# Patient Record
Sex: MALE | Race: WHITE | NOT HISPANIC OR LATINO
[De-identification: names, ages, dates, MRNs, and addresses within clinical notes are randomized per-mention and may not be internally consistent; named-entity substitution may affect disease eponyms.]

---

## 2017-12-12 ENCOUNTER — APPOINTMENT (OUTPATIENT)
Dept: UROLOGY | Facility: CLINIC | Age: 57
End: 2017-12-12
Payer: COMMERCIAL

## 2017-12-12 VITALS
SYSTOLIC BLOOD PRESSURE: 180 MMHG | BODY MASS INDEX: 28.88 KG/M2 | HEIGHT: 67 IN | HEART RATE: 95 BPM | WEIGHT: 184 LBS | DIASTOLIC BLOOD PRESSURE: 101 MMHG

## 2017-12-12 DIAGNOSIS — Z78.9 OTHER SPECIFIED HEALTH STATUS: ICD-10-CM

## 2017-12-12 DIAGNOSIS — I10 ESSENTIAL (PRIMARY) HYPERTENSION: ICD-10-CM

## 2017-12-12 PROCEDURE — 99203 OFFICE O/P NEW LOW 30 MIN: CPT

## 2017-12-12 RX ORDER — ENALAPRIL MALEATE 10 MG/1
10 TABLET ORAL
Refills: 0 | Status: ACTIVE | COMMUNITY

## 2018-01-24 ENCOUNTER — APPOINTMENT (OUTPATIENT)
Dept: UROLOGY | Facility: CLINIC | Age: 58
End: 2018-01-24
Payer: COMMERCIAL

## 2018-01-24 VITALS
DIASTOLIC BLOOD PRESSURE: 88 MMHG | BODY MASS INDEX: 28.88 KG/M2 | WEIGHT: 184 LBS | SYSTOLIC BLOOD PRESSURE: 152 MMHG | HEART RATE: 112 BPM | HEIGHT: 67 IN

## 2018-01-24 PROCEDURE — 99213 OFFICE O/P EST LOW 20 MIN: CPT

## 2018-01-24 PROCEDURE — 76872 US TRANSRECTAL: CPT

## 2018-03-28 ENCOUNTER — APPOINTMENT (OUTPATIENT)
Dept: UROLOGY | Facility: CLINIC | Age: 58
End: 2018-03-28
Payer: COMMERCIAL

## 2018-03-28 VITALS
HEART RATE: 98 BPM | WEIGHT: 184 LBS | SYSTOLIC BLOOD PRESSURE: 169 MMHG | DIASTOLIC BLOOD PRESSURE: 94 MMHG | BODY MASS INDEX: 28.88 KG/M2 | HEIGHT: 67 IN

## 2018-03-28 DIAGNOSIS — R33.9 RETENTION OF URINE, UNSPECIFIED: ICD-10-CM

## 2018-03-28 PROCEDURE — 51741 ELECTRO-UROFLOWMETRY FIRST: CPT

## 2018-03-28 PROCEDURE — 99213 OFFICE O/P EST LOW 20 MIN: CPT

## 2018-03-28 PROCEDURE — 51798 US URINE CAPACITY MEASURE: CPT

## 2018-09-24 ENCOUNTER — APPOINTMENT (OUTPATIENT)
Dept: UROLOGY | Facility: CLINIC | Age: 58
End: 2018-09-24
Payer: COMMERCIAL

## 2018-09-24 VITALS
WEIGHT: 184 LBS | SYSTOLIC BLOOD PRESSURE: 151 MMHG | HEART RATE: 90 BPM | DIASTOLIC BLOOD PRESSURE: 87 MMHG | HEIGHT: 67 IN | BODY MASS INDEX: 28.88 KG/M2

## 2018-09-24 PROCEDURE — 99213 OFFICE O/P EST LOW 20 MIN: CPT

## 2018-10-26 ENCOUNTER — OUTPATIENT (OUTPATIENT)
Dept: OUTPATIENT SERVICES | Facility: HOSPITAL | Age: 58
LOS: 1 days | Discharge: HOME | End: 2018-10-26

## 2018-10-26 ENCOUNTER — APPOINTMENT (OUTPATIENT)
Dept: UROLOGY | Facility: CLINIC | Age: 58
End: 2018-10-26
Payer: COMMERCIAL

## 2018-10-26 ENCOUNTER — LABORATORY RESULT (OUTPATIENT)
Age: 58
End: 2018-10-26

## 2018-10-26 VITALS
HEIGHT: 67 IN | WEIGHT: 192 LBS | BODY MASS INDEX: 30.13 KG/M2 | HEART RATE: 100 BPM | DIASTOLIC BLOOD PRESSURE: 102 MMHG | SYSTOLIC BLOOD PRESSURE: 209 MMHG

## 2018-10-26 PROCEDURE — 99213 OFFICE O/P EST LOW 20 MIN: CPT | Mod: 25

## 2018-10-26 PROCEDURE — 76872 US TRANSRECTAL: CPT

## 2018-10-26 PROCEDURE — 55700: CPT

## 2018-10-29 DIAGNOSIS — R89.7 ABNORMAL HISTOLOGICAL FINDINGS IN SPECIMENS FROM OTHER ORGANS, SYSTEMS AND TISSUES: ICD-10-CM

## 2018-11-12 ENCOUNTER — APPOINTMENT (OUTPATIENT)
Dept: UROLOGY | Facility: CLINIC | Age: 58
End: 2018-11-12
Payer: COMMERCIAL

## 2018-11-12 VITALS
WEIGHT: 192 LBS | DIASTOLIC BLOOD PRESSURE: 101 MMHG | SYSTOLIC BLOOD PRESSURE: 180 MMHG | HEART RATE: 109 BPM | BODY MASS INDEX: 30.13 KG/M2 | HEIGHT: 67 IN

## 2018-11-12 PROCEDURE — 99213 OFFICE O/P EST LOW 20 MIN: CPT

## 2019-02-11 ENCOUNTER — RX RENEWAL (OUTPATIENT)
Age: 59
End: 2019-02-11

## 2019-04-13 ENCOUNTER — OUTPATIENT (OUTPATIENT)
Dept: OUTPATIENT SERVICES | Facility: HOSPITAL | Age: 59
LOS: 1 days | Discharge: HOME | End: 2019-04-13

## 2019-04-13 DIAGNOSIS — R97.20 ELEVATED PROSTATE SPECIFIC ANTIGEN [PSA]: ICD-10-CM

## 2019-05-11 LAB
4K SCORE CALCULATION: 2 %
FREE PSA: 0.55 NG/ML
PERCENT FREE PSA: 17 %
TOTAL PSA: 3.31 NG/ML

## 2019-05-20 ENCOUNTER — APPOINTMENT (OUTPATIENT)
Dept: UROLOGY | Facility: CLINIC | Age: 59
End: 2019-05-20
Payer: COMMERCIAL

## 2019-05-20 PROCEDURE — 99213 OFFICE O/P EST LOW 20 MIN: CPT

## 2019-05-20 NOTE — HISTORY OF PRESENT ILLNESS
[FreeTextEntry1] : This is a 59 year male who presents for follow up of nocturia 3x. \par \par Drinks a lot of water -- 1.5L during dinner\par states that drinks water when wakes to urinate\par \par During the day frequency is present but not as much of a problem.\par no hematuria, no dysuria. \par \par started on flomax for enlarged prostate with symptoms- states that nocturia improved to 1x per night and urinating less during the day-- patient is happy with the results\par quit smoking 20 years ago\par no prostate cancer family history \par \par uroflow/pvr on previous visit: Qmax 18.8 wth PVr of 381 (on my repeat was only 96). Very erratic curve\par Will make an effort to decrease fluid consumption, especially before bedtime\par prostate size = 41g\par \par PSA Profile 4.4, % free = 14\par UCx negative\par prostate biopsy done two weeks ago with all negative cores. \par \par 4K Prostate Score; -- 2% = low risk

## 2019-05-20 NOTE — PHYSICAL EXAM
[General Appearance - Well Developed] : well developed [Normal Appearance] : normal appearance [Well Groomed] : well groomed [General Appearance - Well Nourished] : well nourished [General Appearance - In No Acute Distress] : no acute distress [Abdomen Soft] : soft [Abdomen Tenderness] : non-tender [Costovertebral Angle Tenderness] : no ~M costovertebral angle tenderness [Testes Mass (___cm)] : there were no testicular masses [No Prostate Nodules] : no prostate nodules [Skin Color & Pigmentation] : normal skin color and pigmentation [] : no respiratory distress [Edema] : no peripheral edema [Exaggerated Use Of Accessory Muscles For Inspiration] : no accessory muscle use [Oriented To Time, Place, And Person] : oriented to person, place, and time [Respiration, Rhythm And Depth] : normal respiratory rhythm and effort [Not Anxious] : not anxious [Mood] : the mood was normal [Affect] : the affect was normal [Normal Station and Gait] : the gait and station were normal for the patient's age [No Focal Deficits] : no focal deficits

## 2020-05-02 ENCOUNTER — APPOINTMENT (OUTPATIENT)
Dept: UROLOGY | Facility: CLINIC | Age: 60
End: 2020-05-02
Payer: COMMERCIAL

## 2020-05-02 PROCEDURE — 99213 OFFICE O/P EST LOW 20 MIN: CPT | Mod: 95

## 2020-05-02 RX ORDER — TAMSULOSIN HYDROCHLORIDE 0.4 MG/1
0.4 CAPSULE ORAL
Qty: 90 | Refills: 3 | Status: DISCONTINUED | COMMUNITY
Start: 2017-12-12 | End: 2020-05-02

## 2020-05-02 RX ORDER — CIPROFLOXACIN HYDROCHLORIDE 500 MG/1
500 TABLET, FILM COATED ORAL
Qty: 2 | Refills: 0 | Status: DISCONTINUED | COMMUNITY
Start: 2018-09-24 | End: 2020-05-02

## 2020-05-02 NOTE — PHYSICAL EXAM
[General Appearance - Well Developed] : well developed [General Appearance - Well Nourished] : well nourished [Normal Appearance] : normal appearance [Well Groomed] : well groomed [General Appearance - In No Acute Distress] : no acute distress [Skin Color & Pigmentation] : normal skin color and pigmentation [] : no respiratory distress [Respiration, Rhythm And Depth] : normal respiratory rhythm and effort [Exaggerated Use Of Accessory Muscles For Inspiration] : no accessory muscle use [Oriented To Time, Place, And Person] : oriented to person, place, and time [Affect] : the affect was normal [Mood] : the mood was normal [Not Anxious] : not anxious

## 2020-05-02 NOTE — HISTORY OF PRESENT ILLNESS
[Home] : at home, [unfilled] , at the time of the visit. [Other Location: e.g. Home (Enter Location, City,State)___] : at [unfilled] [Patient] : the patient [Self] : self [FreeTextEntry1] : TELEMEDICINE -- [facility] FOR FOLLOW UP APPOINTMENT\par \par The patient-doctor relationship has been established in a face to face fashion via real time video/audio HIPAA compliant communication using telemedicine software-- he was not able to connect with [facility], so he agreed to proceed with [employer] video . The patient's identity has been confirmed. The patient was previously emailed a copy of the telemedicine consent. They have had a chance to review and has now given verbal consent and has requested care to be assessed and treated through telemedicine and understands there maybe limitations in this process and they may need further follow up care in the office and or hospital settings.\par \par The patient denies fevers, chills, nausea and or vomiting and no unexplained weight loss.\par \par All pertinent parts of the patient PFSH (past medical, family and social histories), laboratory, radiological studies and physician notes were reviewed prior to starting the face to face portion of the telemedicine visit. Questionnaire results were discussed with patient.\par \par ==================================================================================================== \par \par This is a 60 year male who presents for follow up of nocturia 3x. \par \par Drinks a lot of water -- 1.5L during dinner\par states that drinks water when wakes to urinate\par \par During the day frequency is present but not as much of a problem.\par no hematuria, no dysuria. \par \par started on flomax for enlarged prostate with symptoms- states that nocturia improved to 1x per night and urinating less during the day-- patient is happy with the results\par quit smoking 20 years ago\par no prostate cancer family history \par \par uroflow/pvr on previous visit: Qmax 18.8 wth PVr of 381 (on my repeat was only 96). Very erratic curve\par Will make an effort to decrease fluid consumption, especially before bedtime\par prostate size = 41g\par \par PSA Profile 4.4, % free = 14\par UCx negative\par prostate biopsy done with all negative cores. \par \par 4K Prostate Score; -- 2% = low risk. \par \par April 2020\par PSA Profile - Total = 4.6, % free = 15\par will consider urolift in the future if symptoms worsen

## 2020-05-02 NOTE — ASSESSMENT
[FreeTextEntry1] : This is a 60 year male who presents for follow up of nocturia 3x. \par \par Drinks a lot of water -- 1.5L during dinner\par states that drinks water when wakes to urinate\par \par During the day frequency is present but not as much of a problem.\par no hematuria, no dysuria. \par \par started on flomax for enlarged prostate with symptoms- states that nocturia improved to 1x per night and urinating less during the day-- patient is happy with the results\par quit smoking 20 years ago\par no prostate cancer family history \par \par uroflow/pvr on previous visit: Qmax 18.8 wth PVr of 381 (on my repeat was only 96). Very erratic curve\par Will make an effort to decrease fluid consumption, especially before bedtime\par prostate size = 41g\par \par PSA Profile 4.4, % free = 14\par UCx negative\par prostate biopsy done with all negative cores. \par \par 4K Prostate Score; -- 2% = low risk. \par \par April 2020\par PSA Profile - Total = 4.6, % free = 15\par will consider urolift in the future if symptoms worsen

## 2020-07-01 ENCOUNTER — APPOINTMENT (OUTPATIENT)
Dept: UROLOGY | Facility: CLINIC | Age: 60
End: 2020-07-01
Payer: COMMERCIAL

## 2020-07-01 PROCEDURE — 99213 OFFICE O/P EST LOW 20 MIN: CPT | Mod: 95

## 2020-07-01 NOTE — ASSESSMENT
[FreeTextEntry1] : \par This is a 60 year male who presents for follow up of nocturia 3x. \par \par Drinks a lot of water -- 1.5L during dinner\par states that drinks water when wakes to urinate\par \par During the day frequency is present but not as much of a problem.\par no hematuria, no dysuria. \par \par started on flomax for enlarged prostate with symptoms- states that nocturia improved to 1x per night and urinating less during the day-- patient is happy with the results\par quit smoking 20 years ago\par no prostate cancer family history \par \par uroflow/pvr on previous visit: Qmax 18.8 wth PVr of 381 (on my repeat was only 96). Very erratic curve\par Will make an effort to decrease fluid consumption, especially before bedtime\par prostate size = 41g\par \par PSA Profile 4.4, % free = 14\par UCx negative\par prostate biopsy done with all negative cores. \par \par 4K Prostate Score; -- 2% = low risk. \par \par April 2020\par PSA Profile - Total = 4.6, % free = 15\par will consider urolift in the future if symptoms worsen. \par \par  June 30, 2020\par Basic Metabolic Panel; \par MR Pelvis w/wo IV Cont;  prostate 44g, no suspicious prostate lesion--  Low likelihood of cancer -- PIRADS 2\par

## 2020-07-01 NOTE — HISTORY OF PRESENT ILLNESS
[Home] : at home, [unfilled] , at the time of the visit. [Verbal consent obtained from patient] : the patient, [unfilled] [Medical Office: ([facility])___] : at the medical office located in  [FreeTextEntry1] : Telephonic visit -- [facility] [place] FOR FOLLOW UP APPOINTMENT\par \par The patient-doctor relationship has been established in an audio HIPAA compliant communication using telemedicine software. The patient's identity has been confirmed. The patient was previously emailed a copy of the consent. They have had a chance to review and has now given verbal consent and has requested care to be assessed and treated through telephone and understands there maybe limitations in this process and they may need further follow up care in the office and or hospital settings.\par \par The patient denies fevers, chills, nausea and or vomiting and no unexplained weight loss.\par \par All pertinent parts of the patient PFSH (past medical, family and social histories), laboratory, radiological studies and physician notes were reviewed prior to starting the visit. Questionnaire results were discussed with patient.\par \par ==================================================================================================== \par \par This is a 60 year male who presents for follow up of nocturia 3x. \par \par Drinks a lot of water -- 1.5L during dinner\par states that drinks water when wakes to urinate\par \par During the day frequency is present but not as much of a problem.\par no hematuria, no dysuria. \par \par started on flomax for enlarged prostate with symptoms- states that nocturia improved to 1x per night and urinating less during the day-- patient is happy with the results\par quit smoking 20 years ago\par no prostate cancer family history \par \par uroflow/pvr on previous visit: Qmax 18.8 wth PVr of 381 (on my repeat was only 96). Very erratic curve\par Will make an effort to decrease fluid consumption, especially before bedtime\par prostate size = 41g\par \par PSA Profile 4.4, % free = 14\par UCx negative\par prostate biopsy done with all negative cores. \par \par 4K Prostate Score; -- 2% = low risk. \par \par April 2020\par PSA Profile - Total = 4.6, % free = 15\par will consider urolift in the future if symptoms worsen. \par \par  June 30, 2020\par Basic Metabolic Panel; \par MR Pelvis w/wo IV Cont;  prostate 44g, no suspicious prostate lesion--  Low likelihood of cancer -- PIRADS 2\par

## 2020-08-05 ENCOUNTER — APPOINTMENT (OUTPATIENT)
Dept: UROLOGY | Facility: CLINIC | Age: 60
End: 2020-08-05

## 2020-09-02 ENCOUNTER — LABORATORY RESULT (OUTPATIENT)
Age: 60
End: 2020-09-02

## 2020-09-02 ENCOUNTER — APPOINTMENT (OUTPATIENT)
Dept: UROLOGY | Facility: CLINIC | Age: 60
End: 2020-09-02
Payer: COMMERCIAL

## 2020-09-02 VITALS — TEMPERATURE: 97.2 F | HEIGHT: 67 IN | BODY MASS INDEX: 30.13 KG/M2 | WEIGHT: 192 LBS

## 2020-09-02 PROCEDURE — 99212 OFFICE O/P EST SF 10 MIN: CPT | Mod: 25

## 2020-09-02 PROCEDURE — 76872 US TRANSRECTAL: CPT

## 2020-09-02 PROCEDURE — 55700: CPT

## 2020-09-02 NOTE — PHYSICAL EXAM
[General Appearance - Well Nourished] : well nourished [Normal Appearance] : normal appearance [General Appearance - Well Developed] : well developed [General Appearance - In No Acute Distress] : no acute distress [Well Groomed] : well groomed [Skin Color & Pigmentation] : normal skin color and pigmentation [Exaggerated Use Of Accessory Muscles For Inspiration] : no accessory muscle use [Respiration, Rhythm And Depth] : normal respiratory rhythm and effort [] : no rash [Affect] : the affect was normal [Oriented To Time, Place, And Person] : oriented to person, place, and time [Mood] : the mood was normal [Not Anxious] : not anxious

## 2020-09-02 NOTE — HISTORY OF PRESENT ILLNESS
[FreeTextEntry1] : This is a 60 year male who presents for follow up of nocturia 3x. \par \par Drinks a lot of water -- 1.5L during dinner\par states that drinks water when wakes to urinate\par \par During the day frequency is present but not as much of a problem.\par no hematuria, no dysuria. \par \par started on flomax for enlarged prostate with symptoms- states that nocturia improved to 1x per night and urinating less during the day-- patient is happy with the results\par quit smoking 20 years ago\par no prostate cancer family history \par \par uroflow/pvr on previous visit: Qmax 18.8 wth PVr of 381 (on my repeat was only 96). Very erratic curve\par Will make an effort to decrease fluid consumption, especially before bedtime\par prostate size = 41g\par \par PSA Profile 4.4, % free = 14\par UCx negative\par prostate biopsy done with all negative cores. \par \par 4K Prostate Score; -- 2% = low risk. \par \par April 2020\par PSA Profile - Total = 4.6, % free = 15\par will consider urolift in the future if symptoms worsen. \par \par  June 30, 2020\par Basic Metabolic Panel; \par MR Pelvis w/wo IV Cont; prostate 44g, no suspicious prostate lesion-- Low likelihood of cancer -- PIRADS 2\par \par prostate biopsy was performed today

## 2020-09-07 ENCOUNTER — TRANSCRIPTION ENCOUNTER (OUTPATIENT)
Age: 60
End: 2020-09-07

## 2020-09-11 ENCOUNTER — APPOINTMENT (OUTPATIENT)
Dept: UROLOGY | Facility: CLINIC | Age: 60
End: 2020-09-11
Payer: COMMERCIAL

## 2020-09-11 PROCEDURE — 99213 OFFICE O/P EST LOW 20 MIN: CPT

## 2020-09-11 NOTE — ASSESSMENT
[FreeTextEntry1] : This is a 60 year male who presents for follow up of nocturia 3x and s/p prostate biopsy which showed all negative cores\par \par Drinks a lot of water -- 1.5L during dinner\par states that drinks water when wakes to urinate\par \par During the day frequency is present but not as much of a problem.\par no hematuria, no dysuria. \par \par started on flomax for enlarged prostate with symptoms- states that nocturia improved to 1x per night and urinating less during the day-- patient is happy with the results\par quit smoking 20 years ago\par no prostate cancer family history \par \par uroflow/pvr on previous visit: Qmax 18.8 wth PVr of 381 (on my repeat was only 96). Very erratic curve\par Will make an effort to decrease fluid consumption, especially before bedtime\par prostate size = 41g\par \par PSA Profile 4.4, % free = 14\par UCx negative\par prostate biopsy done with all negative cores. \par \par 4K Prostate Score; -- 2% = low risk. \par \par April 2020\par PSA Profile - Total = 4.6, % free = 15\par will consider urolift in the future if symptoms worsen. \par \par  June 30, 2020\par Basic Metabolic Panel; \par MR Pelvis w/wo IV Cont; prostate 44g, no suspicious prostate lesion-- Low likelihood of cancer -- PIRADS  2\par

## 2020-09-11 NOTE — PHYSICAL EXAM
[General Appearance - Well Nourished] : well nourished [General Appearance - Well Developed] : well developed [General Appearance - In No Acute Distress] : no acute distress [Well Groomed] : well groomed [Normal Appearance] : normal appearance [Abdomen Soft] : soft [Costovertebral Angle Tenderness] : no ~M costovertebral angle tenderness [Abdomen Tenderness] : non-tender [Urinary Bladder Findings] : the bladder was normal on palpation [Urethral Meatus] : meatus normal [Scrotum] : the scrotum was normal [No Prostate Nodules] : no prostate nodules [Testes Mass (___cm)] : there were no testicular masses [Edema] : no peripheral edema [Respiration, Rhythm And Depth] : normal respiratory rhythm and effort [] : no respiratory distress [Exaggerated Use Of Accessory Muscles For Inspiration] : no accessory muscle use [Affect] : the affect was normal [Oriented To Time, Place, And Person] : oriented to person, place, and time [Mood] : the mood was normal [Not Anxious] : not anxious [Normal Station and Gait] : the gait and station were normal for the patient's age [No Focal Deficits] : no focal deficits [No Palpable Adenopathy] : no palpable adenopathy

## 2020-09-16 ENCOUNTER — APPOINTMENT (OUTPATIENT)
Dept: UROLOGY | Facility: CLINIC | Age: 60
End: 2020-09-16

## 2021-01-21 ENCOUNTER — NON-APPOINTMENT (OUTPATIENT)
Age: 61
End: 2021-01-21

## 2021-03-24 ENCOUNTER — APPOINTMENT (OUTPATIENT)
Dept: UROLOGY | Facility: CLINIC | Age: 61
End: 2021-03-24
Payer: COMMERCIAL

## 2021-03-24 VITALS — WEIGHT: 190 LBS | HEIGHT: 67 IN | TEMPERATURE: 96.6 F | BODY MASS INDEX: 29.82 KG/M2

## 2021-03-24 PROCEDURE — 99072 ADDL SUPL MATRL&STAF TM PHE: CPT

## 2021-03-24 PROCEDURE — 99214 OFFICE O/P EST MOD 30 MIN: CPT

## 2021-03-24 NOTE — HISTORY OF PRESENT ILLNESS
[FreeTextEntry1] : This is a 61 year male who presents for follow up of nocturia 3x and s/p prostate biopsy which showed all negative cores\par \par Drinks a lot of water -- 1.5L during dinner\par states that drinks water when wakes to urinate\par \par During the day frequency is present but not as much of a problem.\par no hematuria, no dysuria. \par \par started on flomax for enlarged prostate with symptoms- states that nocturia improved to 1x per night and urinating less during the day-- patient is happy with the results\par quit smoking 20 years ago\par no prostate cancer family history \par \par uroflow/pvr on previous visit: Qmax 18.8 wth PVr of 381 (on my repeat was only 96). Very erratic curve\par Will make an effort to decrease fluid consumption, especially before bedtime\par prostate size = 41g\par \par PSA Profile 4.4, % free = 14\par UCx negative\par prostate biopsy done with all negative cores. \par \par 4K Prostate Score; -- 2% = low risk. \par \par April 2020\par PSA Profile - Total = 4.6, % free = 15\par will consider urolift in the future if symptoms worsen. \par \par  June 30, 2020\par Basic Metabolic Panel; \par MR Pelvis w/wo IV Cont; prostate 44g, no suspicious prostate lesion-- Low likelihood of cancer -- PIRADS 2\par . \par Feb 2021\par PSA Profile - Total = 5.1. %free = 12\par UA = neg\par

## 2021-03-24 NOTE — ASSESSMENT
[FreeTextEntry1] : This is a 61 year male who presents for follow up of nocturia 3x and s/p prostate biopsy which showed all negative cores\par \par Drinks a lot of water -- 1.5L during dinner\par states that drinks water when wakes to urinate\par \par During the day frequency is present but not as much of a problem.\par no hematuria, no dysuria. \par \par started on flomax for enlarged prostate with symptoms- states that nocturia improved to 1x per night and urinating less during the day-- patient is happy with the results\par quit smoking 20 years ago\par no prostate cancer family history \par \par uroflow/pvr on previous visit: Qmax 18.8 wth PVr of 381 (on my repeat was only 96). Very erratic curve\par Will make an effort to decrease fluid consumption, especially before bedtime\par prostate size = 41g\par \par PSA Profile 4.4, % free = 14\par UCx negative\par prostate biopsy done with all negative cores. \par \par 4K Prostate Score; -- 2% = low risk. \par \par April 2020\par PSA Profile - Total = 4.6, % free = 15\par will consider urolift in the future if symptoms worsen. \par \par  June 30, 2020\par Basic Metabolic Panel; \par MR Pelvis w/wo IV Cont; prostate 44g, no suspicious prostate lesion-- Low likelihood of cancer -- PIRADS 2\par . \par Feb 2021\par PSA Profile - Total = 5.1. %free = 12\par UA = neg

## 2021-06-23 ENCOUNTER — APPOINTMENT (OUTPATIENT)
Dept: UROLOGY | Facility: CLINIC | Age: 61
End: 2021-06-23
Payer: COMMERCIAL

## 2021-06-23 VITALS
HEART RATE: 96 BPM | WEIGHT: 190 LBS | BODY MASS INDEX: 29.82 KG/M2 | DIASTOLIC BLOOD PRESSURE: 114 MMHG | HEIGHT: 67 IN | SYSTOLIC BLOOD PRESSURE: 184 MMHG | TEMPERATURE: 97.1 F

## 2021-06-23 DIAGNOSIS — R97.20 ELEVATED PROSTATE, SPECIFIC ANTIGEN [PSA]: ICD-10-CM

## 2021-06-23 PROCEDURE — 99072 ADDL SUPL MATRL&STAF TM PHE: CPT

## 2021-06-23 PROCEDURE — 99213 OFFICE O/P EST LOW 20 MIN: CPT | Mod: 25

## 2021-06-23 PROCEDURE — 52000 CYSTOURETHROSCOPY: CPT

## 2021-07-18 NOTE — HISTORY OF PRESENT ILLNESS
[FreeTextEntry1] : This is a 61 year male who presents for follow up of nocturia 3x and s/p prostate biopsy which showed all negative cores\par \par Drinks a lot of water -- 1.5L during dinner\par states that drinks water when wakes to urinate\par \par During the day frequency is present but not as much of a problem.\par no hematuria, no dysuria. \par \par started on flomax for enlarged prostate with symptoms- states that nocturia improved to 1x per night and urinating less during the day-- patient is happy with the results\par quit smoking 20 years ago\par no prostate cancer family history \par \par uroflow/pvr on previous visit: Qmax 18.8 wth PVr of 381 (on my repeat was only 96). Very erratic curve\par Will make an effort to decrease fluid consumption, especially before bedtime\par prostate size = 41g\par \par PSA Profile 4.4, % free = 14\par UCx negative\par prostate biopsy done with all negative cores. \par \par 4K Prostate Score; -- 2% = low risk. \par \par April 2020\par PSA Profile - Total = 4.6, % free = 15\par will consider urolift in the future if symptoms worsen. \par \par  June 30, 2020\par Basic Metabolic Panel; \par MR Pelvis w/wo IV Cont; prostate 44g, no suspicious prostate lesion-- Low likelihood of cancer -- PIRADS 2\par . \par Feb 2021\par PSA Profile - Total = 5.1. %free = 12\par UA = neg. \par \par  \par  May 2021\par PSA Profile - Total = 4.8, % free = 13\par \par offered biopsy they prefer to repeat PSA again given two prior negative biopsies\par \par cysto on June 2021 showed high prostate and moderate obstruction

## 2021-11-13 ENCOUNTER — APPOINTMENT (OUTPATIENT)
Age: 61
End: 2021-11-13

## 2022-05-11 ENCOUNTER — APPOINTMENT (OUTPATIENT)
Dept: UROLOGY | Facility: CLINIC | Age: 62
End: 2022-05-11
Payer: COMMERCIAL

## 2022-05-11 PROCEDURE — 99213 OFFICE O/P EST LOW 20 MIN: CPT

## 2022-05-11 RX ORDER — TAMSULOSIN HYDROCHLORIDE 0.4 MG/1
0.4 CAPSULE ORAL
Qty: 90 | Refills: 3 | Status: DISCONTINUED | COMMUNITY
Start: 2019-02-11 | End: 2022-05-11

## 2022-05-11 NOTE — ASSESSMENT
[FreeTextEntry1] : This is a 62 year male who presents for follow up of nocturia 3x and s/p prostate biopsy which showed all negative cores\par \par Drinks a lot of water -- 1.5L during dinner\par states that drinks water when wakes to urinate\par \par During the day frequency is present but not as much of a problem.\par no hematuria, no dysuria. \par \par started on flomax for enlarged prostate with symptoms- states that nocturia improved to 2x per night and urinating less during the day\par quit smoking 20 years ago\par no prostate cancer family history \par \par uroflow/pvr on previous visit: Qmax 18.8 wth PVr of 381 (on my repeat was only 96). Very erratic curve\par Will make an effort to decrease fluid consumption, especially before bedtime\par prostate size = 41g\par \par PSA Profile 4.4, % free = 14\par UCx negative\par prostate biopsy done with all negative cores. \par \par 4K Prostate Score; -- 2% = low risk. \par \par April 2020\par PSA Profile - Total = 4.6, % free = 15\par will consider urolift in the future if symptoms worsen. \par \par  June 30, 2020\par Basic Metabolic Panel; \par MR Pelvis w/wo IV Cont; prostate 44g, no suspicious prostate lesion-- Low likelihood of cancer -- PIRADS 2\par . \par Feb 2021\par PSA Profile - Total = 5.1. %free = 12\par UA = neg. \par \par \par  May 2021\par PSA Profile - Total = 4.8, % free = 13\par \par offered biopsy they prefer to repeat PSA again given two prior negative biopsies\par \par cysto on June 2021 showed high prostate and moderate obstruction. \par prostate is high -- if requires treatment in the future would avoid urolift and stick with rzum vs turp \par \par oct 2021 = 3.8\par PSA Profile - Total

## 2022-05-11 NOTE — HISTORY OF PRESENT ILLNESS
[FreeTextEntry1] : This is a 62 year male who presents for follow up of nocturia 3x and s/p prostate biopsy which showed all negative cores\par \par Drinks a lot of water -- 1.5L during dinner\par states that drinks water when wakes to urinate\par \par During the day frequency is present but not as much of a problem.\par no hematuria, no dysuria. \par \par started on flomax for enlarged prostate with symptoms- states that nocturia improved to 2x per night and urinating less during the day\par quit smoking 20 years ago\par no prostate cancer family history \par \par uroflow/pvr on previous visit: Qmax 18.8 wth PVr of 381 (on my repeat was only 96). Very erratic curve\par Will make an effort to decrease fluid consumption, especially before bedtime\par prostate size = 41g\par \par PSA Profile 4.4, % free = 14\par UCx negative\par prostate biopsy done with all negative cores. \par \par 4K Prostate Score; -- 2% = low risk. \par \par April 2020\par PSA Profile - Total = 4.6, % free = 15\par will consider urolift in the future if symptoms worsen. \par \par  June 30, 2020\par Basic Metabolic Panel; \par MR Pelvis w/wo IV Cont; prostate 44g, no suspicious prostate lesion-- Low likelihood of cancer -- PIRADS 2\par . \par Feb 2021\par PSA Profile - Total = 5.1. %free = 12\par UA = neg. \par \par \par  May 2021\par PSA Profile - Total = 4.8, % free = 13\par \par offered biopsy they prefer to repeat PSA again given two prior negative biopsies\par \par cysto on June 2021 showed high prostate and moderate obstruction. \par prostate is high -- if requires treatment in the future would avoid urolift and stick with rzum vs turp \par \par oct 2021 = 3.8\par PSA Profile - Total\par

## 2022-07-01 ENCOUNTER — APPOINTMENT (OUTPATIENT)
Dept: UROLOGY | Facility: CLINIC | Age: 62
End: 2022-07-01

## 2022-07-01 PROCEDURE — 99213 OFFICE O/P EST LOW 20 MIN: CPT

## 2022-07-01 NOTE — HISTORY OF PRESENT ILLNESS
[FreeTextEntry1] : This is a 62 year male who presents for follow up of nocturia 3x and s/p prostate biopsy which showed all negative cores\par psa has been decreasing steadily\par \par Drinks a lot of water -- 1.5L during dinner\par states that drinks water when wakes to urinate\par \par During the day frequency is present but not as much of a problem.\par no hematuria, no dysuria. \par \par quit smoking 20 years ago\par no prostate cancer family history \par \par uroflow/pvr on previous visit: Qmax 18.8 wth PVr of 381 (on my repeat was only 96). Very erratic curve\par Will make an effort to decrease fluid consumption, especially before bedtime\par prostate size = 41g\par \par PSA Profile 4.4, % free = 14\par UCx negative\par prostate biopsy done with all negative cores. \par \par 4K Prostate Score; -- 2% = low risk. \par \par April 2020\par PSA Profile - Total = 4.6, % free = 15\par will consider urolift in the future if symptoms worsen. \par \par  June 30, 2020\par Basic Metabolic Panel; \par MR Pelvis w/wo IV Cont; prostate 44g, no suspicious prostate lesion-- Low likelihood of cancer -- PIRADS 2\par . \par Feb 2021\par PSA Profile - Total = 5.1. %free = 12\par UA = neg. \par \par  May 2021\par PSA Profile - Total = 4.8, % free = 13\par \par offered biopsy they prefer to repeat PSA again given two prior negative biopsies\par \par cysto on June 2021 showed high prostate and moderate obstruction. \par prostate is high -- if requires treatment in the future would avoid urolift and stick with rzum vs turp \par \par PSA Profile - Total. \par oct 2021 = 3.8\par \par Started: Silodosin 8 MG Oral Capsule (Rapaflo) better than flomax\par \par june 2022\par PSA Profile - Total -- 3.5\par

## 2023-01-11 ENCOUNTER — APPOINTMENT (OUTPATIENT)
Dept: UROLOGY | Facility: CLINIC | Age: 63
End: 2023-01-11
Payer: COMMERCIAL

## 2023-01-11 VITALS
DIASTOLIC BLOOD PRESSURE: 100 MMHG | WEIGHT: 188 LBS | HEART RATE: 99 BPM | HEIGHT: 67 IN | BODY MASS INDEX: 29.51 KG/M2 | SYSTOLIC BLOOD PRESSURE: 171 MMHG

## 2023-01-11 DIAGNOSIS — R35.1 NOCTURIA: ICD-10-CM

## 2023-01-11 DIAGNOSIS — Z12.5 ENCOUNTER FOR SCREENING FOR MALIGNANT NEOPLASM OF PROSTATE: ICD-10-CM

## 2023-01-11 DIAGNOSIS — R35.0 FREQUENCY OF MICTURITION: ICD-10-CM

## 2023-01-11 PROCEDURE — 99214 OFFICE O/P EST MOD 30 MIN: CPT

## 2023-01-11 RX ORDER — CIPROFLOXACIN HYDROCHLORIDE 500 MG/1
500 TABLET, FILM COATED ORAL
Qty: 2 | Refills: 0 | Status: COMPLETED | COMMUNITY
Start: 2020-07-01 | End: 2023-01-11

## 2023-01-11 NOTE — HISTORY OF PRESENT ILLNESS
[FreeTextEntry1] : Patient is a 63-year-old male with history of bothersome lower urinary tract symptoms managed on silodosin 8 mg daily.  He has history of MRI of prostate in 2020 which was 44 g and history of benign prostate biopsy due to elevation in PSA.  Patient also had a cystoscopy in June 2021 which showed a high prostate with moderate obstruction.\par \par Patient presents to office today to discuss surgical options for his prostate as he feels the medication is only helping intermittently.  He continues to have nocturia 2-3 times a night.  He also reports intermittent weak urinary stream and a sensation of incomplete bladder emptying.\par \par Prior:\par june 2022\par PSA Profile - Total -- 3.5\par \par PSA Profile - Total. \par oct 2021 = 3.8\par \par June 30, 2020\par Basic Metabolic Panel; \par MR Pelvis w/wo IV Cont; prostate 44g, no suspicious prostate lesion-- Low likelihood of cancer -- PIRADS 2\par . \par Feb 2021\par PSA Profile - Total = 5.1. %free = 12\par UA = neg. \par \par  May 2021\par PSA Profile - Total = 4.8, % free = 13

## 2023-01-11 NOTE — ASSESSMENT
[FreeTextEntry1] : 63-year-old with BPH on silodosin 8 mg daily.\par Presents for consult to review surgeries for BPH.\par \par Given cystoscopy findings in June 2021 would recommend REZUM versus TURP.\par \par Both procedures were reviewed with patient in detail.  Side effects and risks of procedures were reviewed in detail.\par \par After review patient would like to schedule TURP.  He is made aware of risks of bleeding, infection, and permanent retrograde ejaculation.  He is made aware of Mohamud catheter after procedure for few days due to bleeding.  Patient made aware of rare risk of incontinence after procedure.\par Patient and his son verbalized understanding of these risks and would like to schedule TURP.\par \par Plan\par -Schedule OR\par \par

## 2023-02-02 ENCOUNTER — RESULT REVIEW (OUTPATIENT)
Age: 63
End: 2023-02-02

## 2023-02-02 ENCOUNTER — OUTPATIENT (OUTPATIENT)
Dept: OUTPATIENT SERVICES | Facility: HOSPITAL | Age: 63
LOS: 1 days | Discharge: HOME | End: 2023-02-02
Payer: COMMERCIAL

## 2023-02-02 VITALS
OXYGEN SATURATION: 100 % | TEMPERATURE: 97 F | WEIGHT: 182.98 LBS | DIASTOLIC BLOOD PRESSURE: 78 MMHG | HEART RATE: 90 BPM | RESPIRATION RATE: 18 BRPM | SYSTOLIC BLOOD PRESSURE: 142 MMHG | HEIGHT: 67 IN

## 2023-02-02 DIAGNOSIS — Z98.890 OTHER SPECIFIED POSTPROCEDURAL STATES: Chronic | ICD-10-CM

## 2023-02-02 DIAGNOSIS — Z01.818 ENCOUNTER FOR OTHER PREPROCEDURAL EXAMINATION: ICD-10-CM

## 2023-02-02 DIAGNOSIS — N40.1 BENIGN PROSTATIC HYPERPLASIA WITH LOWER URINARY TRACT SYMPTOMS: ICD-10-CM

## 2023-02-02 LAB
ALBUMIN SERPL ELPH-MCNC: 4.9 G/DL — SIGNIFICANT CHANGE UP (ref 3.5–5.2)
ALP SERPL-CCNC: 73 U/L — SIGNIFICANT CHANGE UP (ref 30–115)
ALT FLD-CCNC: 25 U/L — SIGNIFICANT CHANGE UP (ref 0–41)
ANION GAP SERPL CALC-SCNC: 14 MMOL/L — SIGNIFICANT CHANGE UP (ref 7–14)
APPEARANCE UR: CLEAR — SIGNIFICANT CHANGE UP
APTT BLD: 32.5 SEC — SIGNIFICANT CHANGE UP (ref 27–39.2)
AST SERPL-CCNC: 16 U/L — SIGNIFICANT CHANGE UP (ref 0–41)
BASOPHILS # BLD AUTO: 0.03 K/UL — SIGNIFICANT CHANGE UP (ref 0–0.2)
BASOPHILS NFR BLD AUTO: 0.4 % — SIGNIFICANT CHANGE UP (ref 0–1)
BILIRUB SERPL-MCNC: 0.3 MG/DL — SIGNIFICANT CHANGE UP (ref 0.2–1.2)
BILIRUB UR-MCNC: NEGATIVE — SIGNIFICANT CHANGE UP
BUN SERPL-MCNC: 17 MG/DL — SIGNIFICANT CHANGE UP (ref 10–20)
CALCIUM SERPL-MCNC: 9.7 MG/DL — SIGNIFICANT CHANGE UP (ref 8.4–10.5)
CHLORIDE SERPL-SCNC: 101 MMOL/L — SIGNIFICANT CHANGE UP (ref 98–110)
CO2 SERPL-SCNC: 25 MMOL/L — SIGNIFICANT CHANGE UP (ref 17–32)
COLOR SPEC: YELLOW — SIGNIFICANT CHANGE UP
CREAT SERPL-MCNC: 1.1 MG/DL — SIGNIFICANT CHANGE UP (ref 0.7–1.5)
DIFF PNL FLD: NEGATIVE — SIGNIFICANT CHANGE UP
EGFR: 75 ML/MIN/1.73M2 — SIGNIFICANT CHANGE UP
EOSINOPHIL # BLD AUTO: 0.02 K/UL — SIGNIFICANT CHANGE UP (ref 0–0.7)
EOSINOPHIL NFR BLD AUTO: 0.3 % — SIGNIFICANT CHANGE UP (ref 0–8)
GLUCOSE SERPL-MCNC: 168 MG/DL — HIGH (ref 70–99)
GLUCOSE UR QL: NEGATIVE — SIGNIFICANT CHANGE UP
HCT VFR BLD CALC: 46 % — SIGNIFICANT CHANGE UP (ref 42–52)
HGB BLD-MCNC: 15.3 G/DL — SIGNIFICANT CHANGE UP (ref 14–18)
IMM GRANULOCYTES NFR BLD AUTO: 0.4 % — HIGH (ref 0.1–0.3)
INR BLD: 1.01 RATIO — SIGNIFICANT CHANGE UP (ref 0.65–1.3)
KETONES UR-MCNC: NEGATIVE — SIGNIFICANT CHANGE UP
LEUKOCYTE ESTERASE UR-ACNC: NEGATIVE — SIGNIFICANT CHANGE UP
LYMPHOCYTES # BLD AUTO: 1.01 K/UL — LOW (ref 1.2–3.4)
LYMPHOCYTES # BLD AUTO: 13.9 % — LOW (ref 20.5–51.1)
MCHC RBC-ENTMCNC: 28.3 PG — SIGNIFICANT CHANGE UP (ref 27–31)
MCHC RBC-ENTMCNC: 33.3 G/DL — SIGNIFICANT CHANGE UP (ref 32–37)
MCV RBC AUTO: 85.2 FL — SIGNIFICANT CHANGE UP (ref 80–94)
MONOCYTES # BLD AUTO: 0.41 K/UL — SIGNIFICANT CHANGE UP (ref 0.1–0.6)
MONOCYTES NFR BLD AUTO: 5.6 % — SIGNIFICANT CHANGE UP (ref 1.7–9.3)
NEUTROPHILS # BLD AUTO: 5.76 K/UL — SIGNIFICANT CHANGE UP (ref 1.4–6.5)
NEUTROPHILS NFR BLD AUTO: 79.4 % — HIGH (ref 42.2–75.2)
NITRITE UR-MCNC: NEGATIVE — SIGNIFICANT CHANGE UP
NRBC # BLD: 0 /100 WBCS — SIGNIFICANT CHANGE UP (ref 0–0)
PH UR: 6 — SIGNIFICANT CHANGE UP (ref 5–8)
PLATELET # BLD AUTO: 201 K/UL — SIGNIFICANT CHANGE UP (ref 130–400)
POTASSIUM SERPL-MCNC: 3.9 MMOL/L — SIGNIFICANT CHANGE UP (ref 3.5–5)
POTASSIUM SERPL-SCNC: 3.9 MMOL/L — SIGNIFICANT CHANGE UP (ref 3.5–5)
PROT SERPL-MCNC: 7.5 G/DL — SIGNIFICANT CHANGE UP (ref 6–8)
PROT UR-MCNC: SIGNIFICANT CHANGE UP
PROTHROM AB SERPL-ACNC: 11.5 SEC — SIGNIFICANT CHANGE UP (ref 9.95–12.87)
RBC # BLD: 5.4 M/UL — SIGNIFICANT CHANGE UP (ref 4.7–6.1)
RBC # FLD: 12.6 % — SIGNIFICANT CHANGE UP (ref 11.5–14.5)
SODIUM SERPL-SCNC: 140 MMOL/L — SIGNIFICANT CHANGE UP (ref 135–146)
SP GR SPEC: 1.02 — SIGNIFICANT CHANGE UP (ref 1.01–1.03)
UROBILINOGEN FLD QL: SIGNIFICANT CHANGE UP
WBC # BLD: 7.26 K/UL — SIGNIFICANT CHANGE UP (ref 4.8–10.8)
WBC # FLD AUTO: 7.26 K/UL — SIGNIFICANT CHANGE UP (ref 4.8–10.8)

## 2023-02-02 PROCEDURE — 71046 X-RAY EXAM CHEST 2 VIEWS: CPT | Mod: 26

## 2023-02-02 PROCEDURE — 93010 ELECTROCARDIOGRAM REPORT: CPT

## 2023-02-02 NOTE — H&P PST ADULT - HISTORY OF PRESENT ILLNESS
63 yr old man to past for cystoscopy transurethral resection of prostate bipolar under gen anes dr chaudhary [place] on 2[place]28/23  pt with h/o bph  psa 3.5 and  LUTS c/o urgency and frequency with urination now for this procedure states has been occurring for approx 1 yr.past    Pt reports no cardiopulmonary issues denies sob/carroll/cp/palpitations. Pt states no recent infections no fever no cough no uti uri. Stated exercise tolerance is   2 - 3  flights no changes. Scottie screen revd.    Pt denies any s/s covid 19 and reports no contact with known positive people. Pt has appointment for repeat covid testing pre op and instructed to continue to self monitor and report any concerns to MD. Pt will continue to practice self isolation and  exposure control measures pre op  s/p vaccine and booster    Anesthesia Alert  NO--Difficult Airway  NO--History of neck surgery or radiation  NO--Limited ROM of neck  NO--History of Malignant hyperthermia  NO--No personal or family history of Pseudocholinesterase deficiency.  NO--Prior Anesthesia Complication  NO--Latex Allergy  NO--Loose teeth  NO--History of Rheumatoid Arthritis  NO--SCOTTIE  NO--Other_____  no bleeding issues    Benign prostatic hyperplasia with lower urinary tract symptoms    Encounter for other preprocedural examination    Benign prostatic hyperplasia with lower urinary tract symptoms    Encounter for other preprocedural examination     63 yr old man to past for cystoscopy transurethral resection of prostate bipolar under gen anes dr chaudhary [place] on 2/28/23  pt with h/o bph  psa 3.5 and  LUTS c/o urgency and frequency with urination now for this procedure states has been occurring for approx 1 yr.past    Pt reports no cardiopulmonary issues denies sob/carroll/cp/palpitations. Pt states no recent infections no fever no cough no uti uri. Stated exercise tolerance is   2 - 3  flights no changes. Scottie screen revd. pt runs 2 miles/ 32 treadmill daily yrs and walks 12-14,000 steps a day pt hr in past  states checks bp and hr at home with work outs states basline hr 80s -130s with exercise for years no cp palp sob   pmh htn dl  ex smoker psh left wrist orif 1970s   Pt denies any s/s covid 19 and reports no contact with known positive people. Pt has appointment for repeat covid testing pre op and instructed to continue to self monitor and report any concerns to MD. Pt will continue to practice self isolation and  exposure control measures pre op  s/p vaccine and booster    Anesthesia Alert  NO--Difficult Airway  NO--History of neck surgery or radiation  NO--Limited ROM of neck  NO--History of Malignant hyperthermia  NO--No personal or family history of Pseudocholinesterase deficiency.  NO--Prior Anesthesia Complication  NO--Latex Allergy  NO--Loose teeth  NO--History of Rheumatoid Arthritis  NO--SCOTTIE  NO--Other_____  no bleeding issues    Benign prostatic hyperplasia with lower urinary tract symptoms    Encounter for other preprocedural examination    Benign prostatic hyperplasia with lower urinary tract symptoms    Encounter for other preprocedural examination

## 2023-02-02 NOTE — H&P PST ADULT - NSICDXPASTMEDICALHX_GEN_ALL_CORE_FT
PAST MEDICAL HISTORY:  BPH (benign prostatic hyperplasia)      PAST MEDICAL HISTORY:  BPH (benign prostatic hyperplasia)     Dyslipidemia     HTN (hypertension)

## 2023-02-02 NOTE — H&P PST ADULT - COMMENTS
hr  pt denies cp sob no fever states "anxious "  per pt bp and hr at home runs 85 resting  when exercise  130s  for years has been checking with daily work outs

## 2023-02-02 NOTE — H&P PST ADULT - NSICDXPASTSURGICALHX_GEN_ALL_CORE_FT
PAST SURGICAL HISTORY:  H/O colonoscopy     S/P ORIF (open reduction internal fixation) fracture

## 2023-02-02 NOTE — H&P PST ADULT - OTHER CARE PROVIDERS
cardio musticulo lv 1/25/23 sent for pre op eval by sx seen has appt 2/24 for stress test (baseline ) and follow up

## 2023-02-02 NOTE — H&P PST ADULT - REASON FOR ADMISSION
63 yr old man to past for cystoscopy transurethral resection of prostate bipolar under gen anes dr chaudhary or [place] on 2/28/23

## 2023-02-04 LAB
CULTURE RESULTS: SIGNIFICANT CHANGE UP
SPECIMEN SOURCE: SIGNIFICANT CHANGE UP

## 2023-02-23 PROBLEM — I10 ESSENTIAL (PRIMARY) HYPERTENSION: Chronic | Status: ACTIVE | Noted: 2023-02-02

## 2023-02-23 PROBLEM — N40.0 BENIGN PROSTATIC HYPERPLASIA WITHOUT LOWER URINARY TRACT SYMPTOMS: Chronic | Status: ACTIVE | Noted: 2023-02-02

## 2023-02-23 PROBLEM — E78.5 HYPERLIPIDEMIA, UNSPECIFIED: Chronic | Status: ACTIVE | Noted: 2023-02-02

## 2023-02-25 ENCOUNTER — LABORATORY RESULT (OUTPATIENT)
Age: 63
End: 2023-02-25

## 2023-02-28 ENCOUNTER — OUTPATIENT (OUTPATIENT)
Dept: INPATIENT UNIT | Facility: HOSPITAL | Age: 63
LOS: 1 days | Discharge: HOME | End: 2023-02-28
Payer: COMMERCIAL

## 2023-02-28 ENCOUNTER — TRANSCRIPTION ENCOUNTER (OUTPATIENT)
Age: 63
End: 2023-02-28

## 2023-02-28 ENCOUNTER — RESULT REVIEW (OUTPATIENT)
Age: 63
End: 2023-02-28

## 2023-02-28 ENCOUNTER — APPOINTMENT (OUTPATIENT)
Dept: UROLOGY | Facility: HOSPITAL | Age: 63
End: 2023-02-28

## 2023-02-28 VITALS
DIASTOLIC BLOOD PRESSURE: 93 MMHG | HEIGHT: 67 IN | RESPIRATION RATE: 17 BRPM | HEART RATE: 98 BPM | TEMPERATURE: 98 F | OXYGEN SATURATION: 97 % | WEIGHT: 181 LBS | SYSTOLIC BLOOD PRESSURE: 173 MMHG

## 2023-02-28 VITALS — DIASTOLIC BLOOD PRESSURE: 81 MMHG | SYSTOLIC BLOOD PRESSURE: 154 MMHG | HEART RATE: 89 BPM

## 2023-02-28 DIAGNOSIS — N40.1 BENIGN PROSTATIC HYPERPLASIA WITH LOWER URINARY TRACT SYMPTOMS: ICD-10-CM

## 2023-02-28 DIAGNOSIS — Z98.890 OTHER SPECIFIED POSTPROCEDURAL STATES: Chronic | ICD-10-CM

## 2023-02-28 PROCEDURE — 88305 TISSUE EXAM BY PATHOLOGIST: CPT

## 2023-02-28 PROCEDURE — 88344 IMHCHEM/IMCYTCHM EA MLT ANTB: CPT

## 2023-02-28 PROCEDURE — 88305 TISSUE EXAM BY PATHOLOGIST: CPT | Mod: 26

## 2023-02-28 PROCEDURE — 88342 IMHCHEM/IMCYTCHM 1ST ANTB: CPT | Mod: 26

## 2023-02-28 PROCEDURE — 52601 PROSTATECTOMY (TURP): CPT

## 2023-02-28 RX ORDER — OXYCODONE HYDROCHLORIDE 5 MG/1
5 TABLET ORAL ONCE
Refills: 0 | Status: DISCONTINUED | OUTPATIENT
Start: 2023-02-28 | End: 2023-02-28

## 2023-02-28 RX ORDER — HYDROMORPHONE HYDROCHLORIDE 2 MG/ML
1 INJECTION INTRAMUSCULAR; INTRAVENOUS; SUBCUTANEOUS
Refills: 0 | Status: DISCONTINUED | OUTPATIENT
Start: 2023-02-28 | End: 2023-02-28

## 2023-02-28 RX ORDER — ONDANSETRON 8 MG/1
4 TABLET, FILM COATED ORAL ONCE
Refills: 0 | Status: DISCONTINUED | OUTPATIENT
Start: 2023-02-28 | End: 2023-02-28

## 2023-02-28 RX ORDER — ACETAMINOPHEN 500 MG
3 TABLET ORAL
Qty: 84 | Refills: 0
Start: 2023-02-28 | End: 2023-03-06

## 2023-02-28 RX ORDER — ACETAMINOPHEN 500 MG
650 TABLET ORAL ONCE
Refills: 0 | Status: DISCONTINUED | OUTPATIENT
Start: 2023-02-28 | End: 2023-02-28

## 2023-02-28 RX ORDER — ASPIRIN/CALCIUM CARB/MAGNESIUM 324 MG
1 TABLET ORAL
Qty: 0 | Refills: 0 | DISCHARGE

## 2023-02-28 RX ORDER — DOCUSATE SODIUM 100 MG
1 CAPSULE ORAL
Qty: 42 | Refills: 0
Start: 2023-02-28 | End: 2023-03-13

## 2023-02-28 RX ORDER — SODIUM CHLORIDE 9 MG/ML
1000 INJECTION, SOLUTION INTRAVENOUS
Refills: 0 | Status: DISCONTINUED | OUTPATIENT
Start: 2023-02-28 | End: 2023-02-28

## 2023-02-28 RX ORDER — HYDROMORPHONE HYDROCHLORIDE 2 MG/ML
0.5 INJECTION INTRAMUSCULAR; INTRAVENOUS; SUBCUTANEOUS
Refills: 0 | Status: DISCONTINUED | OUTPATIENT
Start: 2023-02-28 | End: 2023-02-28

## 2023-02-28 RX ORDER — MEPERIDINE HYDROCHLORIDE 50 MG/ML
12.5 INJECTION INTRAMUSCULAR; INTRAVENOUS; SUBCUTANEOUS ONCE
Refills: 0 | Status: DISCONTINUED | OUTPATIENT
Start: 2023-02-28 | End: 2023-02-28

## 2023-02-28 RX ORDER — PHENAZOPYRIDINE HCL 100 MG
1 TABLET ORAL
Qty: 4 | Refills: 0
Start: 2023-02-28 | End: 2023-03-01

## 2023-02-28 NOTE — PRE-ANESTHESIA EVALUATION ADULT - NSANTHOSAYNRD_GEN_A_CORE
denies/No. KALEY screening performed.  STOP BANG Legend: 0-2 = LOW Risk; 3-4 = INTERMEDIATE Risk; 5-8 = HIGH Risk

## 2023-02-28 NOTE — BRIEF OPERATIVE NOTE - NSICDXBRIEFPREOP_GEN_ALL_CORE_FT
PRE-OP DIAGNOSIS:  BPH with obstruction/lower urinary tract symptoms 28-Feb-2023 13:40:40  Chandana Harris

## 2023-02-28 NOTE — BRIEF OPERATIVE NOTE - NSICDXBRIEFPOSTOP_GEN_ALL_CORE_FT
POST-OP DIAGNOSIS:  BPH with obstruction/lower urinary tract symptoms 28-Feb-2023 13:40:53  Chandana Harris

## 2023-02-28 NOTE — ASU DISCHARGE PLAN (ADULT/PEDIATRIC) - CARE PROVIDER_API CALL
Chandana Harris)  Urology  [address] [place] [address], [address]  [place] [address]  Phone: [phone]  Fax: [phone]  Follow Up Time:

## 2023-02-28 NOTE — ASU PATIENT PROFILE, ADULT - NSICDXPASTMEDICALHX_GEN_ALL_CORE_FT
PAST MEDICAL HISTORY:  BPH (benign prostatic hyperplasia)     Dyslipidemia     HTN (hypertension)

## 2023-02-28 NOTE — ASU DISCHARGE PLAN (ADULT/PEDIATRIC) - NS MD DC FALL RISK RISK
For information on Fall & Injury Prevention, visit: [email or web address] OR  [email or web address] OR  https://www.cdc.gov/steadi/patient.html

## 2023-03-03 ENCOUNTER — APPOINTMENT (OUTPATIENT)
Dept: UROLOGY | Facility: CLINIC | Age: 63
End: 2023-03-03
Payer: COMMERCIAL

## 2023-03-03 VITALS
SYSTOLIC BLOOD PRESSURE: 148 MMHG | TEMPERATURE: 98 F | WEIGHT: 188 LBS | BODY MASS INDEX: 29.51 KG/M2 | OXYGEN SATURATION: 99 % | HEIGHT: 67 IN | DIASTOLIC BLOOD PRESSURE: 86 MMHG | HEART RATE: 88 BPM | RESPIRATION RATE: 14 BRPM

## 2023-03-03 PROCEDURE — 99024 POSTOP FOLLOW-UP VISIT: CPT

## 2023-03-03 NOTE — HISTORY OF PRESENT ILLNESS
[FreeTextEntry1] : urine clear in bag\par billings removed today\par finish medications\par follow up in 3 months\par knows to call me if unable to void by the afternoon or go to ER

## 2023-03-04 ENCOUNTER — EMERGENCY (EMERGENCY)
Facility: HOSPITAL | Age: 63
LOS: 0 days | Discharge: HOME | End: 2023-03-04
Attending: EMERGENCY MEDICINE
Payer: COMMERCIAL

## 2023-03-04 VITALS
WEIGHT: 181 LBS | SYSTOLIC BLOOD PRESSURE: 172 MMHG | DIASTOLIC BLOOD PRESSURE: 86 MMHG | HEART RATE: 104 BPM | OXYGEN SATURATION: 98 % | HEIGHT: 67 IN | RESPIRATION RATE: 18 BRPM | TEMPERATURE: 98 F

## 2023-03-04 DIAGNOSIS — Z98.890 OTHER SPECIFIED POSTPROCEDURAL STATES: ICD-10-CM

## 2023-03-04 DIAGNOSIS — E78.5 HYPERLIPIDEMIA, UNSPECIFIED: ICD-10-CM

## 2023-03-04 DIAGNOSIS — Z90.79 ACQUIRED ABSENCE OF OTHER GENITAL ORGAN(S): ICD-10-CM

## 2023-03-04 DIAGNOSIS — I10 ESSENTIAL (PRIMARY) HYPERTENSION: ICD-10-CM

## 2023-03-04 DIAGNOSIS — R33.8 OTHER RETENTION OF URINE: ICD-10-CM

## 2023-03-04 DIAGNOSIS — Z87.438 PERSONAL HISTORY OF OTHER DISEASES OF MALE GENITAL ORGANS: ICD-10-CM

## 2023-03-04 DIAGNOSIS — Z98.890 OTHER SPECIFIED POSTPROCEDURAL STATES: Chronic | ICD-10-CM

## 2023-03-04 PROCEDURE — 51702 INSERT TEMP BLADDER CATH: CPT

## 2023-03-04 PROCEDURE — 99284 EMERGENCY DEPT VISIT MOD MDM: CPT

## 2023-03-04 NOTE — ED ADULT TRIAGE NOTE - CHIEF COMPLAINT QUOTE
Pt c/o urinary retention since 2200; had billings removed earlier today after having prostate surgery on Tuesday. Pt also c/o pain to base of penis.

## 2023-03-04 NOTE — ED PROVIDER NOTE - OBJECTIVE STATEMENT
53-year-old male with past medical history of hypertension, BPH, and hyperlipidemia who presents to the emergency department for urinary retention.  Reports that he had TURP procedure done 7 days ago and had Mohamud removed yesterday.  Reports that he has not been able to urinate for the past few hours.  Denies fever, shortness breath, chest pain, nausea, vomiting, hematuria, dysuria, and change in bowel movement.

## 2023-03-04 NOTE — ED PROVIDER NOTE - NSFOLLOWUPINSTRUCTIONS_ED_ALL_ED_FT
Please make sure to follow up with your primary care doctor in 3 days.    Please make sure to follow up with your own urologist in 3 days.

## 2023-03-04 NOTE — CHART NOTE - NSCHARTNOTEFT_GEN_A_CORE
64 y/o male  PMH HTN, HLD, BPH  V/S T 97.8,  /86,  ,  RR 18  PE: AXOX3  ABD SUPRA PUBIC FIRM/ TENDER/ FULLNESS  S/P TURP BAÑUELOS CATHETER INSERTION 2/28/23- DR ECKERT  S/P REMOVAL OF BAÑUELOS CATHETER 3/3/23 AM  NOW UNABLE TO VOID SINCE 2200 3/3/23  PRESENTS TO ED IN URINARY RETENTION SINCE 10 PM TONIGHT  16 FR COUDE TIP BAÑUELOS CATHETER PLACED IN ED WITH   450 CC STRAW COLORED URINE OUTPUT   WILL LEAVE BAÑUELOS CATHETER IN PLACE  PATIENT TO FOLLOW UP WITH DR ECKERT UPON DISCHARGE 64 y/o male  PMH HTN, HLD, BPH  V/S T 97.8,  /86,  ,  RR 18  PE: AXOX3  ABD- SUPRA PUBIC FIRM/ TENDER/ FULLNESS  S/P TURP BAÑUELOS CATHETER INSERTION 2/28/23- DR ECKERT  S/P REMOVAL OF BAÑUELOS CATHETER -3/3/23 AM  NOW UNABLE TO VOID SINCE 2200- 3/3/23  PRESENTS TO ED IN URINARY RETENTION SINCE 10 PM TONIGHT  16 FR COUDE TIP BAÑUELOS CATHETER PLACED IN ED WITH   450 CC STRAW COLORED URINE OUTPUT   WILL LEAVE BAÑUELOS CATHETER IN PLACE  PATIENT TO FOLLOW UP WITH DR ECKERT UPON DISCHARGE

## 2023-03-04 NOTE — ED PROVIDER NOTE - PHYSICAL EXAMINATION
CONSTITUTIONAL: in no apparent distress.   HEAD: Normocephalic; atraumatic.   EYES: Pupils are round and reactive, extra-ocular muscles are intact. Eyelids are normal in appearance without swelling or lesions.   ENT: Hearing is intact with good acuity to spoken voice.  Patient is speaking clearly, not muffled and airway is intact.   RESPIRATORY: No signs of respiratory distress. Lung sounds are clear in all lobes bilaterally without rales, rhonchi, or wheezes.  CARDIOVASCULAR: Regular rate and rhythm.   GI: Abdomen is soft, non-tender, and without distention. Bowel sounds are present and normoactive in all four quadrants. No masses are noted.   GENITALIA: Chaperone: Lisa PCA. No penile discharge or lesions. No scrotal swelling or discoloration. Testes descended bilaterally, smooth, without masses.  BACK: No evidence of trauma or deformity. No CVA tenderness bilaterally. Normal ROM.   NEURO: A & O x 3. Normal speech. No focal deficit.  PSYCHOLOGICAL: Appropriate mood and affect. Good judgement and insight.

## 2023-03-04 NOTE — ED PROVIDER NOTE - PROGRESS NOTE DETAILS
Urology consult placed and pt was evaluated by urology. Mohamud placed. No hematuria noticed. Will have the pt follow up with his own urologist OP. Pt is stable for discharge.

## 2023-03-04 NOTE — ED PROVIDER NOTE - ATTENDING APP SHARED VISIT CONTRIBUTION OF CARE
63-year-old male above past medical history status post TURP on February 28 had Mohamud removed this afternoon and voided, now unable to void for the last 2 hours, no fever, on exam is mild suprapubic TTP and distention, urology called and 16 Turkmen coudé placed by EDSON Miller with return 500 cc straw-colored urine, will discharge with leg bag and outpatient follow-up.  Patient counseled regarding conditions which should prompt return.

## 2023-03-04 NOTE — ED PROVIDER NOTE - CLINICAL SUMMARY MEDICAL DECISION MAKING FREE TEXT BOX
63-year-old male above past medical history status post TURP on February 28 had Mohamud removed this afternoon and voided, now unable to void for the last 2 hours, no fever, on exam is mild suprapubic TTP and distention, urology called and 16 Bulgarian coudé placed by EDSON Miller with return 500 cc straw-colored urine, will discharge with leg bag and outpatient follow-up.  Patient counseled regarding conditions which should prompt return.

## 2023-03-04 NOTE — ED PROVIDER NOTE - PATIENT PORTAL LINK FT
You can access the FollowMyHealth Patient Portal offered by [facility] by registering at the following website: [email or web address]. By joining [employer]’s FollowMyHealth portal, you will also be able to view your health information using other applications (apps) compatible with our system.

## 2023-03-07 ENCOUNTER — NON-APPOINTMENT (OUTPATIENT)
Age: 63
End: 2023-03-07

## 2023-03-07 DIAGNOSIS — N13.8 OTHER OBSTRUCTIVE AND REFLUX UROPATHY: ICD-10-CM

## 2023-03-07 DIAGNOSIS — D07.5 CARCINOMA IN SITU OF PROSTATE: ICD-10-CM

## 2023-03-07 DIAGNOSIS — Z79.82 LONG TERM (CURRENT) USE OF ASPIRIN: ICD-10-CM

## 2023-03-07 DIAGNOSIS — I10 ESSENTIAL (PRIMARY) HYPERTENSION: ICD-10-CM

## 2023-03-07 DIAGNOSIS — N40.1 BENIGN PROSTATIC HYPERPLASIA WITH LOWER URINARY TRACT SYMPTOMS: ICD-10-CM

## 2023-03-07 DIAGNOSIS — C61 MALIGNANT NEOPLASM OF PROSTATE: ICD-10-CM

## 2023-03-07 DIAGNOSIS — E78.5 HYPERLIPIDEMIA, UNSPECIFIED: ICD-10-CM

## 2023-03-07 DIAGNOSIS — Z87.891 PERSONAL HISTORY OF NICOTINE DEPENDENCE: ICD-10-CM

## 2023-03-07 LAB — SURGICAL PATHOLOGY STUDY: SIGNIFICANT CHANGE UP

## 2023-03-08 ENCOUNTER — APPOINTMENT (OUTPATIENT)
Dept: UROLOGY | Facility: CLINIC | Age: 63
End: 2023-03-08
Payer: COMMERCIAL

## 2023-03-08 VITALS
BODY MASS INDEX: 29.51 KG/M2 | SYSTOLIC BLOOD PRESSURE: 138 MMHG | HEART RATE: 93 BPM | WEIGHT: 188 LBS | DIASTOLIC BLOOD PRESSURE: 86 MMHG | HEIGHT: 67 IN

## 2023-03-08 DIAGNOSIS — R33.9 RETENTION OF URINE, UNSPECIFIED: ICD-10-CM

## 2023-03-08 PROCEDURE — 99214 OFFICE O/P EST MOD 30 MIN: CPT | Mod: 24

## 2023-03-08 NOTE — ASSESSMENT
[FreeTextEntry1] : This is a 63 year male who presents for trial of void\par went into clot retention a few days ago\par \par Had TURP on Feb 28th 2023\par Pathology from prostate chips showed: adenocarcinoma 3+4= 7 in multiple prostate chips\par \par history of nocturia 3x and s/p prostate biopsy which showed all negative cores in sept 2020\par psa has been decreasing steadily\par \par quit smoking 20 years ago\par no prostate cancer family history \par \par before TURP - \par uroflow/pvr on previous visit: Qmax 18.8 wth PVr of 381 (on my repeat was only 96). Very erratic curve\par prostate size = 41g\par \par PSA Profile 4.4, % free = 14\par UCx negative\par prostate biopsy done with all negative cores. \par \par April 2019\par 4K Prostate Score; -- 2% = low risk. \par \par April 2020\par PSA Profile - Total = 4.6, % free = 15\par \par  June 30, 2020\par Basic Metabolic Panel; \par MR Pelvis w/wo IV Cont; prostate 44g, no suspicious prostate lesion-- Low likelihood of cancer -- PIRADS 2\par . \par Feb 2021\par PSA Profile - Total = 5.1. %free = 12\par UA = neg. \par \par  May 2021\par PSA Profile - Total = 4.8, % free = 13\par \par offered biopsy they prefer to repeat PSA again given two prior negative biopsies\par \par cysto on June 2021 showed high prostate and moderate obstruction. \par prostate is high -- if requires treatment in the future would avoid urolift and stick with rzum vs turp \par \par PSA Profile - Total. \par oct 2021 = 3.8\par \par Started: Silodosin 8 MG Oral Capsule (Rapaflo) better than flomax\par \par june 2022\par PSA Profile - Total -- 3.5

## 2023-03-08 NOTE — HISTORY OF PRESENT ILLNESS
[FreeTextEntry1] : This is a 63 year male who presents for trial of void\par went into clot retention a few days ago\par \par Had TURP on Feb 28th 2023\par Pathology from prostate chips showed: adenocarcinoma 3+4= 7 in multiple prostate chips\par \par history of nocturia 3x and s/p prostate biopsy which showed all negative cores in sept 2020\par psa has been decreasing steadily\par \par quit smoking 20 years ago\par no prostate cancer family history \par \par before TURP - \par uroflow/pvr on previous visit: Qmax 18.8 wth PVr of 381 (on my repeat was only 96). Very erratic curve\par prostate size = 41g\par \par PSA Profile 4.4, % free = 14\par UCx negative\par prostate biopsy done with all negative cores. \par \par 4K Prostate Score; -- 2% = low risk. \par \par April 2020\par PSA Profile - Total = 4.6, % free = 15\par \par  June 30, 2020\par Basic Metabolic Panel; \par MR Pelvis w/wo IV Cont; prostate 44g, no suspicious prostate lesion-- Low likelihood of cancer -- PIRADS 2\par . \par Feb 2021\par PSA Profile - Total = 5.1. %free = 12\par UA = neg. \par \par  May 2021\par PSA Profile - Total = 4.8, % free = 13\par \par offered biopsy they prefer to repeat PSA again given two prior negative biopsies\par \par cysto on June 2021 showed high prostate and moderate obstruction. \par prostate is high -- if requires treatment in the future would avoid urolift and stick with rzum vs turp \par \par PSA Profile - Total. \par oct 2021 = 3.8\par \par Started: Silodosin 8 MG Oral Capsule (Rapaflo) better than flomax\par \par june 2022\par PSA Profile - Total -- 3.5

## 2023-03-09 ENCOUNTER — APPOINTMENT (OUTPATIENT)
Dept: UROLOGY | Facility: CLINIC | Age: 63
End: 2023-03-09
Payer: COMMERCIAL

## 2023-03-09 VITALS
WEIGHT: 188 LBS | TEMPERATURE: 97.3 F | OXYGEN SATURATION: 98 % | DIASTOLIC BLOOD PRESSURE: 82 MMHG | BODY MASS INDEX: 29.51 KG/M2 | HEIGHT: 67 IN | SYSTOLIC BLOOD PRESSURE: 131 MMHG | RESPIRATION RATE: 16 BRPM | HEART RATE: 88 BPM

## 2023-03-09 PROCEDURE — 99215 OFFICE O/P EST HI 40 MIN: CPT | Mod: 24

## 2023-03-09 NOTE — ASSESSMENT
[FreeTextEntry1] : NASREEN BETTS is a 63 year old male hx of elevated PSA status post 2 negative prostate biopsies 2018 and 2020 and negative MRI, BPH and lower urinary tract symptoms not responding to medical management s/p TURP 02/28/2023 , found to have incidental [place] 3+4 PCa on prostate chips pathology who presents for consultation to discuss further management.\par \par We discussed patient's findings of incidental prostate cancer favorable intermediate risk.  He is otherwise healthy and therefore is unlikely to undergo active surveillance though technically is an option.\par He is leaning toward surgery however I recommended that he discuss with radiation oncology radiation options.\par He needs a short period of time anyway for his TURP site to heal and during this process he can come to a decision.\par He will follow-up after obtaining an MRI of the prostate at which point we will discuss robotic assisted radical prostatectomy in more detail.\par \par fu after MRI prostate

## 2023-03-09 NOTE — ADDENDUM
[FreeTextEntry1] : Patient's note was transcribed with the assistance of a medical scribe under the supervision of Dr. Ham.\par I, Dr. Ham, have reviewed the patient's chart and agree that it aligns with my medical decisions.\par Kay Hilario, our scribe, also served as a chaperone for physical examination purposes.\par \par The submitted E/M billing level for this visit reflects the total time spent on the day of the visit including face-to-face time spent with the patient, non-face-to-face review of medical records and relevant information, documentation, and asynchronous communication with the patient after a visit via phone, email, or patient’s EHR portal after the visit. \par The medical records reviewed are either scanned into the chart or reviewed with the patient using a patient’s electronic medical records portal for patients with records not available to [facility] via electronic transmission platforms from other institutions and labs. \par Time spend counseling and performing coordination of care was also included in determining the appropriate EM billing level.\par

## 2023-03-09 NOTE — HISTORY OF PRESENT ILLNESS
[FreeTextEntry1] : NASREEN BETTS is a 63 year old male hx of elevated PSA status post 2 negative prostate biopsies 2018 and 2020 and negative MRI, BPH and lower urinary tract symptoms not responding to medical management s/p TURP 02/28/2023 , found to have incidental [place] 3+4 PCa on prostate chips pathology who presents for consultation to discuss further management.\par \par He underwent a TURP to help alleviative worsening LUTS despite medicinal intervention. \par States he is voiding well now.\par He is here with his son who is acting as primary .  Official  declined.  The pt reports worsening ED but is still sexually active.Following the TURP he does report improved urinary symptoms \par Denies gross hematuria, dysuria or associated symptoms. \par \par Pathology [place] 3+4 prostate cancer grade group 2 approximately 6% of entire tissue volume with [place] 3+4 prostate cancer.  Single focus of perineural invasion.  Predominantly [place] 3+3 prostate cancer and less than 10% is grade 4.\par \par PSA 3.5 07/2022 \par PSA 5.1 February 2021\par PSA 3.31 4K score 2% risk May 2019\par \par Denies  PMH including previous kidney stones, recurrent UTIs. \par Family History: No  malignancies\par Social History:E[place]ian, son is a premedical student graduated from [employer] now a scribe\par \par Old notes reviewed:\par Prostate chips evaluated and found to have [place] 3+4 (7) and 6% of the prostate chips. [place] 4 makes up less than 10% of tissue.\par Hx of two negative prostate biopsies for negative PSA \par

## 2023-04-01 ENCOUNTER — RESULT REVIEW (OUTPATIENT)
Age: 63
End: 2023-04-01

## 2023-04-01 ENCOUNTER — OUTPATIENT (OUTPATIENT)
Dept: OUTPATIENT SERVICES | Facility: HOSPITAL | Age: 63
LOS: 1 days | End: 2023-04-01
Payer: COMMERCIAL

## 2023-04-01 DIAGNOSIS — Z00.8 ENCOUNTER FOR OTHER GENERAL EXAMINATION: ICD-10-CM

## 2023-04-01 DIAGNOSIS — Z98.890 OTHER SPECIFIED POSTPROCEDURAL STATES: Chronic | ICD-10-CM

## 2023-04-01 DIAGNOSIS — C61 MALIGNANT NEOPLASM OF PROSTATE: ICD-10-CM

## 2023-04-01 PROCEDURE — 72197 MRI PELVIS W/O & W/DYE: CPT | Mod: 26

## 2023-04-01 PROCEDURE — A9579: CPT

## 2023-04-01 PROCEDURE — 72197 MRI PELVIS W/O & W/DYE: CPT

## 2023-04-02 DIAGNOSIS — C61 MALIGNANT NEOPLASM OF PROSTATE: ICD-10-CM

## 2023-04-03 ENCOUNTER — NON-APPOINTMENT (OUTPATIENT)
Age: 63
End: 2023-04-03

## 2023-04-18 ENCOUNTER — APPOINTMENT (OUTPATIENT)
Dept: UROLOGY | Facility: CLINIC | Age: 63
End: 2023-04-18
Payer: COMMERCIAL

## 2023-04-18 VITALS
DIASTOLIC BLOOD PRESSURE: 95 MMHG | HEIGHT: 67 IN | HEART RATE: 81 BPM | BODY MASS INDEX: 29.51 KG/M2 | SYSTOLIC BLOOD PRESSURE: 179 MMHG | WEIGHT: 188 LBS

## 2023-04-18 PROCEDURE — 99215 OFFICE O/P EST HI 40 MIN: CPT | Mod: 24

## 2023-04-18 NOTE — ASSESSMENT
[FreeTextEntry1] : NASREEN BETTS is a 63 year old male hx of elevated PSA status post 2 negative prostate biopsies 2018 and 2020 and negative MRI, BPH and lower urinary tract symptoms not responding to medical management s/p TURP 02/28/2023 , found to have incidental [place] 3+4 PCa on prostate chips pathology who presents for consultation to discuss further management.\par \par Plan \par Patient elects undergo robotic assisted radical prostatectomy bilateral pelvic lymph dissection.\par Declined radiation oncology referral.  We discussed potentially higher risk of urinary incontinence with history of TURP.  All of patient's and family's questions were answered\par \par - CT A/P to be done prior to procedure for staging and anatomical assessment\par \par \par Our discussion summarized --\par The natural history of this disease was explained to the patient at length and the treatment options discussed including radical prostatectomy by open or robotic-assisted laparoscopic approach, external-beam radiotherapy, brachytherapy, and watchful waiting, active surveillance, including the probability of success and complications associated with these approaches.\par \par With respect to AS/watchful waiting, we discussed the difficulties of estimating the extent of disease preoperatively, the risk of cancer progression, and risk that salvage might not be possible with progression. \par However, the favorable long-term outcomes of active surveillance in appropriately selected patients was conveyed. \par \par With respect to seed implants, we discussed risks including but not limited to cancer recurrence, exacerbation of voiding symptoms or hematuria, urinary retention, induction of 2nd malignancy, and risks of rectal symptoms or bleeding.  We also discussed risks of the anesthesia including but not limited to MI, CVA, DVT, and PE.  \par With respect to EBRT, we discussed we discussed risks including but not limited to cancer recurrence, exacerbation of voiding symptoms or hematuria, urinary retention, incontinence, stricture of the urinary tract, induction of 2nd malignancy, and risks of rectal symptoms or bleeding.  We discussed fact that rectal symptoms may be more common with EBRT than with other treatment modalities. I did convey that the risk of erectile dysfunction was likely lower with EBRT, at least in the short-term.\par \par With radiation therapy, we discussed the difficulties in diagnosing recurrent disease at an early stage due to variability in PSA levels and the fact that most patients are not candidates for local salvage therapy when biochemical recurrence is declared.  We also discussed the significant morbidity of local salvage therapy in terms of perioperative complications, erectile dysfunction and urinary incontinence.\par \par With respect to radical prostatectomy, the pros and cons of open vs robotic-assisted laparoscopic prostatectomy were discussed. The complications of this procedure were reviewed with the patient and include (but not limited to) urinary incontinence, erectile dysfunction, infertility, anastomotic stricture, lymphocele, hemorrhage requiring transfusion, rectal, ureteral, or nerve injury, infection, cardiovascular, pulmonary, thromboembolic, and anesthetic complications.  For robotic prostatectomy, the additional complications of anastomotic urine leak and small bowel obstruction from adhesions was conveyed. We also discussed the need for a urethral catheter as well as a EFRAIN drain post-operatively.\par \par With surgery, we also discussed the potential advantage over radiation therapy in that biochemical recurrence can be detected at a relatively earlier stage and that salvage radiotherapy is successful in controlling recurrent disease in a substantial proportion of patients.  I also conveyed that salvage radiotherapy was associated with a considerably more favorable morbidity profile compared to local salvage therapies for radiorecurrent disease. \par \par We also discussed prostate cryotherapy in detail, including aspects related to the procedure and the outcomes with respect to cancer control, urinary dysfunction, impotence, and morbidity.\par \par All of the patient questions were answered.  \par \par \par \par \par

## 2023-04-18 NOTE — PHYSICAL EXAM
[General Appearance - Well Developed] : well developed [General Appearance - Well Nourished] : well nourished [Normal Appearance] : normal appearance [Well Groomed] : well groomed [General Appearance - In No Acute Distress] : no acute distress [Abdomen Soft] : soft [Abdomen Tenderness] : non-tender [Costovertebral Angle Tenderness] : no ~M costovertebral angle tenderness [FreeTextEntry1] : thin no scars [Testes Mass (___cm)] : there were no testicular masses [Edema] : no peripheral edema [] : no respiratory distress [Respiration, Rhythm And Depth] : normal respiratory rhythm and effort [Exaggerated Use Of Accessory Muscles For Inspiration] : no accessory muscle use [Oriented To Time, Place, And Person] : oriented to person, place, and time [Affect] : the affect was normal [Mood] : the mood was normal [Not Anxious] : not anxious [Normal Station and Gait] : the gait and station were normal for the patient's age [No Focal Deficits] : no focal deficits [No Palpable Adenopathy] : no palpable adenopathy

## 2023-04-18 NOTE — HISTORY OF PRESENT ILLNESS
[FreeTextEntry1] : NASREEN BETTS is a 63 year old male hx of elevated PSA status post 2 negative prostate biopsies 2018 and 2020 and negative MRI, BPH and lower urinary tract symptoms not responding to medical management s/p TURP 02/28/2023 , found to have incidental Gar[place] 3+4 PCa on prostate chips pathology who presents for consultation to discuss further management.\par \par Pt is here with his wife and son, and has no new LUTS.  Voiding well post TURP.\par Speaks English well however English  offered and patient declined.  Family present both son and wife for discussion\par \par MRI prostate demonstrates 32 g prostate no intravesical protrusion.  No pelvic lymphadenopathy TURP defect noted.  No suspicious lesions.\par \par previously \par The pt reports worsening ED but is still sexually active.Following the TURP he does report improved urinary symptoms \par \par Pathology Garita 3+4 prostate cancer grade group 2 approximately 6% of entire tissue volume with Katie 3+4 prostate cancer.  Single focus of perineural invasion.  Predominantly Garita 3+3 prostate cancer and less than 10% is grade 4.\par \par PSA 3.5 07/2022 \par PSA 5.1 February 2021\par PSA 3.31 4K score 2% risk May 2019\par \par Denies  PMH including previous kidney stones, recurrent UTIs. 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 Katie 4 makes up less than 10% of tissue.\par Hx of two negative prostate biopsies for negative PSA \par

## 2023-04-26 ENCOUNTER — OUTPATIENT (OUTPATIENT)
Dept: OUTPATIENT SERVICES | Facility: HOSPITAL | Age: 63
LOS: 1 days | End: 2023-04-26
Payer: COMMERCIAL

## 2023-04-26 VITALS
TEMPERATURE: 98 F | RESPIRATION RATE: 15 BRPM | HEIGHT: 70 IN | OXYGEN SATURATION: 96 % | HEART RATE: 80 BPM | DIASTOLIC BLOOD PRESSURE: 100 MMHG | SYSTOLIC BLOOD PRESSURE: 182 MMHG | WEIGHT: 190.92 LBS

## 2023-04-26 DIAGNOSIS — Z01.818 ENCOUNTER FOR OTHER PREPROCEDURAL EXAMINATION: ICD-10-CM

## 2023-04-26 DIAGNOSIS — C61 MALIGNANT NEOPLASM OF PROSTATE: ICD-10-CM

## 2023-04-26 DIAGNOSIS — Z98.890 OTHER SPECIFIED POSTPROCEDURAL STATES: Chronic | ICD-10-CM

## 2023-04-26 LAB
ALBUMIN SERPL ELPH-MCNC: 4.6 G/DL — SIGNIFICANT CHANGE UP (ref 3.5–5.2)
ALP SERPL-CCNC: 86 U/L — SIGNIFICANT CHANGE UP (ref 30–115)
ALT FLD-CCNC: 19 U/L — SIGNIFICANT CHANGE UP (ref 0–41)
ANION GAP SERPL CALC-SCNC: 11 MMOL/L — SIGNIFICANT CHANGE UP (ref 7–14)
APPEARANCE UR: CLEAR — SIGNIFICANT CHANGE UP
APTT BLD: 32.9 SEC — SIGNIFICANT CHANGE UP (ref 27–39.2)
AST SERPL-CCNC: 11 U/L — SIGNIFICANT CHANGE UP (ref 0–41)
BACTERIA # UR AUTO: NEGATIVE — SIGNIFICANT CHANGE UP
BILIRUB SERPL-MCNC: 0.2 MG/DL — SIGNIFICANT CHANGE UP (ref 0.2–1.2)
BILIRUB UR-MCNC: NEGATIVE — SIGNIFICANT CHANGE UP
BLD GP AB SCN SERPL QL: SIGNIFICANT CHANGE UP
BUN SERPL-MCNC: 16 MG/DL — SIGNIFICANT CHANGE UP (ref 10–20)
CALCIUM SERPL-MCNC: 9.7 MG/DL — SIGNIFICANT CHANGE UP (ref 8.4–10.5)
CHLORIDE SERPL-SCNC: 104 MMOL/L — SIGNIFICANT CHANGE UP (ref 98–110)
CO2 SERPL-SCNC: 25 MMOL/L — SIGNIFICANT CHANGE UP (ref 17–32)
COLOR SPEC: SIGNIFICANT CHANGE UP
CREAT SERPL-MCNC: 1 MG/DL — SIGNIFICANT CHANGE UP (ref 0.7–1.5)
DIFF PNL FLD: ABNORMAL
EGFR: 84 ML/MIN/1.73M2 — SIGNIFICANT CHANGE UP
EPI CELLS # UR: 1 /HPF — SIGNIFICANT CHANGE UP (ref 0–5)
GLUCOSE SERPL-MCNC: 83 MG/DL — SIGNIFICANT CHANGE UP (ref 70–99)
GLUCOSE UR QL: NEGATIVE — SIGNIFICANT CHANGE UP
HCT VFR BLD CALC: 44.4 % — SIGNIFICANT CHANGE UP (ref 42–52)
HGB BLD-MCNC: 14.6 G/DL — SIGNIFICANT CHANGE UP (ref 14–18)
HYALINE CASTS # UR AUTO: 1 /LPF — SIGNIFICANT CHANGE UP (ref 0–7)
INR BLD: 0.97 RATIO — SIGNIFICANT CHANGE UP (ref 0.65–1.3)
KETONES UR-MCNC: NEGATIVE — SIGNIFICANT CHANGE UP
LEUKOCYTE ESTERASE UR-ACNC: NEGATIVE — SIGNIFICANT CHANGE UP
MCHC RBC-ENTMCNC: 28.5 PG — SIGNIFICANT CHANGE UP (ref 27–31)
MCHC RBC-ENTMCNC: 32.9 G/DL — SIGNIFICANT CHANGE UP (ref 32–37)
MCV RBC AUTO: 86.7 FL — SIGNIFICANT CHANGE UP (ref 80–94)
NITRITE UR-MCNC: NEGATIVE — SIGNIFICANT CHANGE UP
NRBC # BLD: 0 /100 WBCS — SIGNIFICANT CHANGE UP (ref 0–0)
PH UR: 6.5 — SIGNIFICANT CHANGE UP (ref 5–8)
PLATELET # BLD AUTO: 222 K/UL — SIGNIFICANT CHANGE UP (ref 130–400)
PMV BLD: 11 FL — HIGH (ref 7.4–10.4)
POTASSIUM SERPL-MCNC: 4 MMOL/L — SIGNIFICANT CHANGE UP (ref 3.5–5)
POTASSIUM SERPL-SCNC: 4 MMOL/L — SIGNIFICANT CHANGE UP (ref 3.5–5)
PROT SERPL-MCNC: 7.5 G/DL — SIGNIFICANT CHANGE UP (ref 6–8)
PROT UR-MCNC: SIGNIFICANT CHANGE UP
PROTHROM AB SERPL-ACNC: 11 SEC — SIGNIFICANT CHANGE UP (ref 9.95–12.87)
RBC # BLD: 5.12 M/UL — SIGNIFICANT CHANGE UP (ref 4.7–6.1)
RBC # FLD: 12.6 % — SIGNIFICANT CHANGE UP (ref 11.5–14.5)
RBC CASTS # UR COMP ASSIST: 6 /HPF — HIGH (ref 0–4)
SODIUM SERPL-SCNC: 140 MMOL/L — SIGNIFICANT CHANGE UP (ref 135–146)
SP GR SPEC: 1.01 — SIGNIFICANT CHANGE UP (ref 1.01–1.03)
UROBILINOGEN FLD QL: SIGNIFICANT CHANGE UP
WBC # BLD: 7.63 K/UL — SIGNIFICANT CHANGE UP (ref 4.8–10.8)
WBC # FLD AUTO: 7.63 K/UL — SIGNIFICANT CHANGE UP (ref 4.8–10.8)
WBC UR QL: 9 /HPF — HIGH (ref 0–5)

## 2023-04-26 PROCEDURE — 86900 BLOOD TYPING SEROLOGIC ABO: CPT

## 2023-04-26 PROCEDURE — 85610 PROTHROMBIN TIME: CPT

## 2023-04-26 PROCEDURE — 93005 ELECTROCARDIOGRAM TRACING: CPT

## 2023-04-26 PROCEDURE — 81001 URINALYSIS AUTO W/SCOPE: CPT

## 2023-04-26 PROCEDURE — 36415 COLL VENOUS BLD VENIPUNCTURE: CPT

## 2023-04-26 PROCEDURE — 85730 THROMBOPLASTIN TIME PARTIAL: CPT

## 2023-04-26 PROCEDURE — 80053 COMPREHEN METABOLIC PANEL: CPT

## 2023-04-26 PROCEDURE — 93010 ELECTROCARDIOGRAM REPORT: CPT

## 2023-04-26 PROCEDURE — 87086 URINE CULTURE/COLONY COUNT: CPT

## 2023-04-26 PROCEDURE — 86901 BLOOD TYPING SEROLOGIC RH(D): CPT

## 2023-04-26 PROCEDURE — 99214 OFFICE O/P EST MOD 30 MIN: CPT | Mod: 25

## 2023-04-26 PROCEDURE — 86850 RBC ANTIBODY SCREEN: CPT

## 2023-04-26 PROCEDURE — 85027 COMPLETE CBC AUTOMATED: CPT

## 2023-04-26 RX ORDER — HYDROCHLOROTHIAZIDE 25 MG
1 TABLET ORAL
Qty: 0 | Refills: 0 | DISCHARGE

## 2023-04-26 RX ORDER — SILODOSIN 4 MG/1
1 CAPSULE ORAL
Qty: 0 | Refills: 0 | DISCHARGE

## 2023-04-26 NOTE — H&P PST ADULT - NSICDXPASTMEDICALHX_GEN_ALL_CORE_FT
PAST MEDICAL HISTORY:  BPH (benign prostatic hyperplasia)     Dyslipidemia     HTN (hypertension)     Prostate cancer

## 2023-04-26 NOTE — H&P PST ADULT - HISTORY OF PRESENT ILLNESS
63 year old male states "I have prostate cancer" I need prostate out, Son states had hyperplasia in prostate and was having scraped x1 on thru procedure bx done and revealed cancer.  Has been well no illness recently no uri, no uti  can climb many steps no issues  no chest pain sob palp  pt denies any covid s/s, or tested positive in the past  pt advised self quarantine till day of procedure  Patient verbalized understanding of instructions and was given the opportunity to ask questions and have them answered.  written and verbal instructions with teach back on chlorhexidine shampoo provided,  pt verbalized understanding with returned demonstration  As per patient, this is their complete medical and surgical history, including medications both prescribed or over the counter.  Anesthesia Alert  NO--Difficult Airway  NO--History of neck surgery or radiation  NO--Limited ROM of neck  NO--History of Malignant hyperthermia  NO--Personal or family history of Pseudocholinesterase deficiency  NO--Prior Anesthesia Complication  NO--Latex Allergy  NO--Loose teeth  NO--History of Rheumatoid Arthritis  NO--KALEY  NO BLEEDING RISK  NO--Other_____

## 2023-04-26 NOTE — H&P PST ADULT - NSICDXFAMILYHX_GEN_ALL_CORE_FT
FAMILY HISTORY:  Father  Still living? Unknown  FH: stomach cancer, Age at diagnosis: Age Unknown    Mother  Still living? Unknown  Family hx of ALS (amyotrophic lateral sclerosis), Age at diagnosis: Age Unknown

## 2023-04-27 DIAGNOSIS — C61 MALIGNANT NEOPLASM OF PROSTATE: ICD-10-CM

## 2023-04-27 DIAGNOSIS — Z01.818 ENCOUNTER FOR OTHER PREPROCEDURAL EXAMINATION: ICD-10-CM

## 2023-04-28 LAB
CULTURE RESULTS: SIGNIFICANT CHANGE UP
SPECIMEN SOURCE: SIGNIFICANT CHANGE UP

## 2023-04-29 ENCOUNTER — RESULT REVIEW (OUTPATIENT)
Age: 63
End: 2023-04-29

## 2023-04-29 ENCOUNTER — OUTPATIENT (OUTPATIENT)
Dept: OUTPATIENT SERVICES | Facility: HOSPITAL | Age: 63
LOS: 1 days | End: 2023-04-29
Payer: COMMERCIAL

## 2023-04-29 DIAGNOSIS — Z98.890 OTHER SPECIFIED POSTPROCEDURAL STATES: Chronic | ICD-10-CM

## 2023-04-29 DIAGNOSIS — Z00.8 ENCOUNTER FOR OTHER GENERAL EXAMINATION: ICD-10-CM

## 2023-04-29 DIAGNOSIS — C61 MALIGNANT NEOPLASM OF PROSTATE: ICD-10-CM

## 2023-04-29 PROCEDURE — 74177 CT ABD & PELVIS W/CONTRAST: CPT

## 2023-04-29 PROCEDURE — 74177 CT ABD & PELVIS W/CONTRAST: CPT | Mod: 26

## 2023-04-30 DIAGNOSIS — C61 MALIGNANT NEOPLASM OF PROSTATE: ICD-10-CM

## 2023-05-01 ENCOUNTER — NON-APPOINTMENT (OUTPATIENT)
Age: 63
End: 2023-05-01

## 2023-05-05 PROBLEM — C61 MALIGNANT NEOPLASM OF PROSTATE: Chronic | Status: ACTIVE | Noted: 2023-04-26

## 2023-05-08 ENCOUNTER — APPOINTMENT (OUTPATIENT)
Dept: UROLOGY | Facility: CLINIC | Age: 63
End: 2023-05-08
Payer: COMMERCIAL

## 2023-05-08 DIAGNOSIS — Z00.00 ENCOUNTER FOR GENERAL ADULT MEDICAL EXAMINATION W/OUT ABNORMAL FINDINGS: ICD-10-CM

## 2023-05-08 PROCEDURE — 52000 CYSTOURETHROSCOPY: CPT | Mod: 79

## 2023-05-09 NOTE — ASU PATIENT PROFILE, ADULT - FALL HARM RISK - UNIVERSAL INTERVENTIONS
Bed in lowest position, wheels locked, appropriate side rails in place/Call bell, personal items and telephone in reach/Instruct patient to call for assistance before getting out of bed or chair/Non-slip footwear when patient is out of bed/[place] to call system/Physically safe environment - no spills, clutter or unnecessary equipment/Purposeful Proactive Rounding/Room/bathroom lighting operational, light cord in reach

## 2023-05-09 NOTE — ASU PATIENT PROFILE, ADULT - AS SC BRADEN MOBILITY
What Type Of Note Output Would You Prefer (Optional)?: Standard Output How Severe Is Your Acne?: mild Is This A New Presentation, Or A Follow-Up?: Acne Additional Comments (Use Complete Sentences): Patient states that he has been using the Panoxyl Wash for about a month and pts mom stated that she has noticed a difference in his skin since using the wash. Patient would like further evaluation and treatment today. (4) no limitation

## 2023-05-10 ENCOUNTER — RESULT REVIEW (OUTPATIENT)
Age: 63
End: 2023-05-10

## 2023-05-10 ENCOUNTER — TRANSCRIPTION ENCOUNTER (OUTPATIENT)
Age: 63
End: 2023-05-10

## 2023-05-10 ENCOUNTER — APPOINTMENT (OUTPATIENT)
Dept: UROLOGY | Facility: HOSPITAL | Age: 63
End: 2023-05-10

## 2023-05-10 ENCOUNTER — INPATIENT (INPATIENT)
Facility: HOSPITAL | Age: 63
LOS: 0 days | Discharge: HOME | DRG: 708 | End: 2023-05-11
Attending: UROLOGY | Admitting: UROLOGY
Payer: COMMERCIAL

## 2023-05-10 ENCOUNTER — NON-APPOINTMENT (OUTPATIENT)
Age: 63
End: 2023-05-10

## 2023-05-10 VITALS — WEIGHT: 181 LBS | HEIGHT: 67 IN

## 2023-05-10 DIAGNOSIS — Z98.890 OTHER SPECIFIED POSTPROCEDURAL STATES: Chronic | ICD-10-CM

## 2023-05-10 DIAGNOSIS — C61 MALIGNANT NEOPLASM OF PROSTATE: ICD-10-CM

## 2023-05-10 LAB
ANION GAP SERPL CALC-SCNC: 13 MMOL/L — SIGNIFICANT CHANGE UP (ref 7–14)
BASOPHILS # BLD AUTO: 0.02 K/UL — SIGNIFICANT CHANGE UP (ref 0–0.2)
BASOPHILS NFR BLD AUTO: 0.1 % — SIGNIFICANT CHANGE UP (ref 0–1)
BILIRUB UR QL STRIP: NORMAL
BLD GP AB SCN SERPL QL: SIGNIFICANT CHANGE UP
BUN SERPL-MCNC: 24 MG/DL — HIGH (ref 10–20)
CALCIUM SERPL-MCNC: 8.7 MG/DL — SIGNIFICANT CHANGE UP (ref 8.4–10.5)
CHLORIDE SERPL-SCNC: 100 MMOL/L — SIGNIFICANT CHANGE UP (ref 98–110)
CO2 SERPL-SCNC: 21 MMOL/L — SIGNIFICANT CHANGE UP (ref 17–32)
COLLECTION METHOD: NORMAL
CREAT SERPL-MCNC: 1.2 MG/DL — SIGNIFICANT CHANGE UP (ref 0.7–1.5)
EGFR: 68 ML/MIN/1.73M2 — SIGNIFICANT CHANGE UP
EOSINOPHIL # BLD AUTO: 0 K/UL — SIGNIFICANT CHANGE UP (ref 0–0.7)
EOSINOPHIL NFR BLD AUTO: 0 % — SIGNIFICANT CHANGE UP (ref 0–8)
GLUCOSE SERPL-MCNC: 175 MG/DL — HIGH (ref 70–99)
GLUCOSE UR-MCNC: NORMAL
HCG UR QL: 0.2 EU/DL
HCT VFR BLD CALC: 40.7 % — LOW (ref 42–52)
HGB BLD-MCNC: 13.7 G/DL — LOW (ref 14–18)
HGB UR QL STRIP.AUTO: NORMAL
IMM GRANULOCYTES NFR BLD AUTO: 0.5 % — HIGH (ref 0.1–0.3)
KETONES UR-MCNC: NORMAL
LEUKOCYTE ESTERASE UR QL STRIP: NORMAL
LYMPHOCYTES # BLD AUTO: 0.71 K/UL — LOW (ref 1.2–3.4)
LYMPHOCYTES # BLD AUTO: 4.6 % — LOW (ref 20.5–51.1)
MCHC RBC-ENTMCNC: 29.4 PG — SIGNIFICANT CHANGE UP (ref 27–31)
MCHC RBC-ENTMCNC: 33.7 G/DL — SIGNIFICANT CHANGE UP (ref 32–37)
MCV RBC AUTO: 87.3 FL — SIGNIFICANT CHANGE UP (ref 80–94)
MONOCYTES # BLD AUTO: 0.25 K/UL — SIGNIFICANT CHANGE UP (ref 0.1–0.6)
MONOCYTES NFR BLD AUTO: 1.6 % — LOW (ref 1.7–9.3)
NEUTROPHILS # BLD AUTO: 14.36 K/UL — HIGH (ref 1.4–6.5)
NEUTROPHILS NFR BLD AUTO: 93.2 % — HIGH (ref 42.2–75.2)
NITRITE UR QL STRIP: NORMAL
NRBC # BLD: 0 /100 WBCS — SIGNIFICANT CHANGE UP (ref 0–0)
PH UR STRIP: 6
PLATELET # BLD AUTO: 181 K/UL — SIGNIFICANT CHANGE UP (ref 130–400)
PMV BLD: 10.5 FL — HIGH (ref 7.4–10.4)
POTASSIUM SERPL-MCNC: 4.1 MMOL/L — SIGNIFICANT CHANGE UP (ref 3.5–5)
POTASSIUM SERPL-SCNC: 4.1 MMOL/L — SIGNIFICANT CHANGE UP (ref 3.5–5)
PROT UR STRIP-MCNC: NORMAL
RBC # BLD: 4.66 M/UL — LOW (ref 4.7–6.1)
RBC # FLD: 12.7 % — SIGNIFICANT CHANGE UP (ref 11.5–14.5)
SODIUM SERPL-SCNC: 134 MMOL/L — LOW (ref 135–146)
SP GR UR STRIP: 1.01
WBC # BLD: 15.41 K/UL — HIGH (ref 4.8–10.8)
WBC # FLD AUTO: 15.41 K/UL — HIGH (ref 4.8–10.8)

## 2023-05-10 PROCEDURE — 38571 LAPAROSCOPY LYMPHADENECTOMY: CPT | Mod: AS,58

## 2023-05-10 PROCEDURE — 86850 RBC ANTIBODY SCREEN: CPT

## 2023-05-10 PROCEDURE — 38571 LAPAROSCOPY LYMPHADENECTOMY: CPT | Mod: 58

## 2023-05-10 PROCEDURE — 86901 BLOOD TYPING SEROLOGIC RH(D): CPT

## 2023-05-10 PROCEDURE — 88302 TISSUE EXAM BY PATHOLOGIST: CPT

## 2023-05-10 PROCEDURE — 88309 TISSUE EXAM BY PATHOLOGIST: CPT | Mod: 26

## 2023-05-10 PROCEDURE — 80048 BASIC METABOLIC PNL TOTAL CA: CPT

## 2023-05-10 PROCEDURE — C1889: CPT

## 2023-05-10 PROCEDURE — 55866 LAPS SURG PRST8ECT RPBIC RAD: CPT | Mod: AS,58

## 2023-05-10 PROCEDURE — 55866 LAPS SURG PRST8ECT RPBIC RAD: CPT | Mod: 58

## 2023-05-10 PROCEDURE — 88307 TISSUE EXAM BY PATHOLOGIST: CPT

## 2023-05-10 PROCEDURE — 88307 TISSUE EXAM BY PATHOLOGIST: CPT | Mod: 26

## 2023-05-10 PROCEDURE — 85025 COMPLETE CBC W/AUTO DIFF WBC: CPT

## 2023-05-10 PROCEDURE — C9399: CPT

## 2023-05-10 PROCEDURE — 88302 TISSUE EXAM BY PATHOLOGIST: CPT | Mod: 26

## 2023-05-10 PROCEDURE — 36415 COLL VENOUS BLD VENIPUNCTURE: CPT

## 2023-05-10 PROCEDURE — S2900 ROBOTIC SURGICAL SYSTEM: CPT | Mod: NC

## 2023-05-10 PROCEDURE — 88309 TISSUE EXAM BY PATHOLOGIST: CPT

## 2023-05-10 PROCEDURE — S2900: CPT

## 2023-05-10 PROCEDURE — 86900 BLOOD TYPING SEROLOGIC ABO: CPT

## 2023-05-10 RX ORDER — KETOROLAC TROMETHAMINE 30 MG/ML
15 SYRINGE (ML) INJECTION EVERY 6 HOURS
Refills: 0 | Status: DISCONTINUED | OUTPATIENT
Start: 2023-05-10 | End: 2023-05-11

## 2023-05-10 RX ORDER — SENNA PLUS 8.6 MG/1
2 TABLET ORAL AT BEDTIME
Refills: 0 | Status: DISCONTINUED | OUTPATIENT
Start: 2023-05-10 | End: 2023-05-11

## 2023-05-10 RX ORDER — ONDANSETRON 8 MG/1
4 TABLET, FILM COATED ORAL ONCE
Refills: 0 | Status: DISCONTINUED | OUTPATIENT
Start: 2023-05-10 | End: 2023-05-10

## 2023-05-10 RX ORDER — OXYCODONE HYDROCHLORIDE 5 MG/1
5 TABLET ORAL EVERY 6 HOURS
Refills: 0 | Status: DISCONTINUED | OUTPATIENT
Start: 2023-05-10 | End: 2023-05-11

## 2023-05-10 RX ORDER — PANTOPRAZOLE SODIUM 20 MG/1
40 TABLET, DELAYED RELEASE ORAL
Refills: 0 | Status: DISCONTINUED | OUTPATIENT
Start: 2023-05-10 | End: 2023-05-11

## 2023-05-10 RX ORDER — ROSUVASTATIN CALCIUM 5 MG/1
1 TABLET ORAL
Qty: 0 | Refills: 0 | DISCHARGE

## 2023-05-10 RX ORDER — SODIUM CHLORIDE 9 MG/ML
1000 INJECTION INTRAMUSCULAR; INTRAVENOUS; SUBCUTANEOUS
Refills: 0 | Status: DISCONTINUED | OUTPATIENT
Start: 2023-05-10 | End: 2023-05-11

## 2023-05-10 RX ORDER — CEFAZOLIN SODIUM 1 G
2000 VIAL (EA) INJECTION EVERY 8 HOURS
Refills: 0 | Status: COMPLETED | OUTPATIENT
Start: 2023-05-10 | End: 2023-05-10

## 2023-05-10 RX ORDER — HYDROMORPHONE HYDROCHLORIDE 2 MG/ML
0.5 INJECTION INTRAMUSCULAR; INTRAVENOUS; SUBCUTANEOUS
Refills: 0 | Status: DISCONTINUED | OUTPATIENT
Start: 2023-05-10 | End: 2023-05-10

## 2023-05-10 RX ORDER — ACETAMINOPHEN 500 MG
1000 TABLET ORAL EVERY 6 HOURS
Refills: 0 | Status: DISCONTINUED | OUTPATIENT
Start: 2023-05-10 | End: 2023-05-11

## 2023-05-10 RX ORDER — ATORVASTATIN CALCIUM 80 MG/1
40 TABLET, FILM COATED ORAL AT BEDTIME
Refills: 0 | Status: DISCONTINUED | OUTPATIENT
Start: 2023-05-10 | End: 2023-05-11

## 2023-05-10 RX ORDER — HEPARIN SODIUM 5000 [USP'U]/ML
5000 INJECTION INTRAVENOUS; SUBCUTANEOUS EVERY 8 HOURS
Refills: 0 | Status: DISCONTINUED | OUTPATIENT
Start: 2023-05-10 | End: 2023-05-11

## 2023-05-10 RX ORDER — HYDROMORPHONE HYDROCHLORIDE 2 MG/ML
1 INJECTION INTRAMUSCULAR; INTRAVENOUS; SUBCUTANEOUS
Refills: 0 | Status: DISCONTINUED | OUTPATIENT
Start: 2023-05-10 | End: 2023-05-10

## 2023-05-10 RX ORDER — ONDANSETRON 8 MG/1
4 TABLET, FILM COATED ORAL EVERY 6 HOURS
Refills: 0 | Status: DISCONTINUED | OUTPATIENT
Start: 2023-05-10 | End: 2023-05-11

## 2023-05-10 RX ADMIN — Medication 15 MILLIGRAM(S): at 23:09

## 2023-05-10 RX ADMIN — SODIUM CHLORIDE 125 MILLILITER(S): 9 INJECTION INTRAMUSCULAR; INTRAVENOUS; SUBCUTANEOUS at 14:06

## 2023-05-10 RX ADMIN — HEPARIN SODIUM 5000 UNIT(S): 5000 INJECTION INTRAVENOUS; SUBCUTANEOUS at 21:04

## 2023-05-10 RX ADMIN — ATORVASTATIN CALCIUM 40 MILLIGRAM(S): 80 TABLET, FILM COATED ORAL at 21:04

## 2023-05-10 RX ADMIN — Medication 1000 MILLIGRAM(S): at 18:14

## 2023-05-10 RX ADMIN — Medication 1000 MILLIGRAM(S): at 23:09

## 2023-05-10 RX ADMIN — Medication 100 MILLIGRAM(S): at 14:04

## 2023-05-10 RX ADMIN — SENNA PLUS 2 TABLET(S): 8.6 TABLET ORAL at 21:04

## 2023-05-10 RX ADMIN — Medication 100 MILLIGRAM(S): at 21:04

## 2023-05-10 NOTE — BRIEF OPERATIVE NOTE - COMMENTS
I, Jessica Blankenship PA-C, performed the duties of first assist during the above listed surgery which includes, but is not limited to, retraction, suctioning and suturing. There was no competent resident available for the case.

## 2023-05-10 NOTE — PRE-ANESTHESIA EVALUATION ADULT - NSANTHPMHFT_GEN_ALL_CORE
METS>4 without CP/palpitations/sob  Denies orthopnea    cards clearance reviewed  stress negative for ischemia

## 2023-05-10 NOTE — CHART NOTE - NSCHARTNOTEFT_GEN_A_CORE
PACU ANESTHESIA ADMISSION NOTE      Procedure:   Post op diagnosis:      ____  Intubated  TV:______       Rate: ______      FiO2: ______    __x__  Patent Airway    __x__  Full return of protective reflexes    __x__  Full recovery from anesthesia / back to baseline status    Vitals  HR: 107  BP: 122/67  RR: 18  O2 Sat: 96%  Temp: 98.2    Mental Status:  __x__ Awake   ___x__ Alert   _____ Drowsy   _____ Sedated    Nausea/Vomiting:  __x__ NO  ______Yes,   See Post - Op Orders          Pain Scale (0-10):  _____    Treatment: ____ None    __x__ See Post - Op/PCA Orders    Post - Operative Fluids:   ____ Oral   __x__ See Post - Op Orders    Plan: Discharge when criteria met:   ____Home       __x___Floor     _____Critical Care   Other:_________________    Comments: Patient had smooth intraoperative event, no anesthesia complication.

## 2023-05-11 ENCOUNTER — TRANSCRIPTION ENCOUNTER (OUTPATIENT)
Age: 63
End: 2023-05-11

## 2023-05-11 VITALS
OXYGEN SATURATION: 96 % | DIASTOLIC BLOOD PRESSURE: 83 MMHG | HEART RATE: 84 BPM | TEMPERATURE: 98 F | SYSTOLIC BLOOD PRESSURE: 143 MMHG | RESPIRATION RATE: 18 BRPM

## 2023-05-11 LAB
ANION GAP SERPL CALC-SCNC: 9 MMOL/L — SIGNIFICANT CHANGE UP (ref 7–14)
BASOPHILS # BLD AUTO: 0.02 K/UL — SIGNIFICANT CHANGE UP (ref 0–0.2)
BASOPHILS NFR BLD AUTO: 0.2 % — SIGNIFICANT CHANGE UP (ref 0–1)
BUN SERPL-MCNC: 18 MG/DL — SIGNIFICANT CHANGE UP (ref 10–20)
CALCIUM SERPL-MCNC: 8.6 MG/DL — SIGNIFICANT CHANGE UP (ref 8.4–10.5)
CHLORIDE SERPL-SCNC: 104 MMOL/L — SIGNIFICANT CHANGE UP (ref 98–110)
CO2 SERPL-SCNC: 27 MMOL/L — SIGNIFICANT CHANGE UP (ref 17–32)
CREAT SERPL-MCNC: 1.1 MG/DL — SIGNIFICANT CHANGE UP (ref 0.7–1.5)
EGFR: 75 ML/MIN/1.73M2 — SIGNIFICANT CHANGE UP
EOSINOPHIL # BLD AUTO: 0.01 K/UL — SIGNIFICANT CHANGE UP (ref 0–0.7)
EOSINOPHIL NFR BLD AUTO: 0.1 % — SIGNIFICANT CHANGE UP (ref 0–8)
GLUCOSE SERPL-MCNC: 110 MG/DL — HIGH (ref 70–99)
HCT VFR BLD CALC: 37.2 % — LOW (ref 42–52)
HGB BLD-MCNC: 12.2 G/DL — LOW (ref 14–18)
IMM GRANULOCYTES NFR BLD AUTO: 0.6 % — HIGH (ref 0.1–0.3)
LYMPHOCYTES # BLD AUTO: 1.37 K/UL — SIGNIFICANT CHANGE UP (ref 1.2–3.4)
LYMPHOCYTES # BLD AUTO: 13.5 % — LOW (ref 20.5–51.1)
MCHC RBC-ENTMCNC: 28.6 PG — SIGNIFICANT CHANGE UP (ref 27–31)
MCHC RBC-ENTMCNC: 32.8 G/DL — SIGNIFICANT CHANGE UP (ref 32–37)
MCV RBC AUTO: 87.3 FL — SIGNIFICANT CHANGE UP (ref 80–94)
MONOCYTES # BLD AUTO: 0.87 K/UL — HIGH (ref 0.1–0.6)
MONOCYTES NFR BLD AUTO: 8.6 % — SIGNIFICANT CHANGE UP (ref 1.7–9.3)
NEUTROPHILS # BLD AUTO: 7.82 K/UL — HIGH (ref 1.4–6.5)
NEUTROPHILS NFR BLD AUTO: 77 % — HIGH (ref 42.2–75.2)
NRBC # BLD: 0 /100 WBCS — SIGNIFICANT CHANGE UP (ref 0–0)
PLATELET # BLD AUTO: 175 K/UL — SIGNIFICANT CHANGE UP (ref 130–400)
PMV BLD: 11.1 FL — HIGH (ref 7.4–10.4)
POTASSIUM SERPL-MCNC: 4.8 MMOL/L — SIGNIFICANT CHANGE UP (ref 3.5–5)
POTASSIUM SERPL-SCNC: 4.8 MMOL/L — SIGNIFICANT CHANGE UP (ref 3.5–5)
RBC # BLD: 4.26 M/UL — LOW (ref 4.7–6.1)
RBC # FLD: 12.8 % — SIGNIFICANT CHANGE UP (ref 11.5–14.5)
SODIUM SERPL-SCNC: 140 MMOL/L — SIGNIFICANT CHANGE UP (ref 135–146)
WBC # BLD: 10.15 K/UL — SIGNIFICANT CHANGE UP (ref 4.8–10.8)
WBC # FLD AUTO: 10.15 K/UL — SIGNIFICANT CHANGE UP (ref 4.8–10.8)

## 2023-05-11 RX ORDER — CIPROFLOXACIN LACTATE 400MG/40ML
1 VIAL (ML) INTRAVENOUS
Qty: 6 | Refills: 0
Start: 2023-05-11 | End: 2023-05-13

## 2023-05-11 RX ORDER — OXYCODONE HYDROCHLORIDE 5 MG/1
1 TABLET ORAL
Qty: 12 | Refills: 0
Start: 2023-05-11 | End: 2023-05-13

## 2023-05-11 RX ORDER — ACETAMINOPHEN 500 MG
2 TABLET ORAL
Qty: 0 | Refills: 0 | DISCHARGE
Start: 2023-05-11

## 2023-05-11 RX ORDER — DOCUSATE SODIUM 100 MG
1 CAPSULE ORAL
Qty: 60 | Refills: 0
Start: 2023-05-11 | End: 2023-06-09

## 2023-05-11 RX ADMIN — Medication 15 MILLIGRAM(S): at 05:46

## 2023-05-11 RX ADMIN — Medication 1000 MILLIGRAM(S): at 05:47

## 2023-05-11 RX ADMIN — PANTOPRAZOLE SODIUM 40 MILLIGRAM(S): 20 TABLET, DELAYED RELEASE ORAL at 05:47

## 2023-05-11 RX ADMIN — HEPARIN SODIUM 5000 UNIT(S): 5000 INJECTION INTRAVENOUS; SUBCUTANEOUS at 05:47

## 2023-05-11 RX ADMIN — Medication 20 MILLIGRAM(S): at 05:46

## 2023-05-11 NOTE — DISCHARGE NOTE PROVIDER - CARE PROVIDER_API CALL
Aroldo Ham)  Urology  [address] [place] [address], [address]  [place] [address]  Phone: [phone]  Fax: [phone]  Established Patient  Follow Up Time:

## 2023-05-11 NOTE — DISCHARGE NOTE PROVIDER - NSDCMRMEDTOKEN_GEN_ALL_CORE_FT
enalapril 20 mg oral tablet: orally once a day  rosuvastatin 10 mg oral tablet: 1 tab(s) orally once a day   acetaminophen 500 mg oral tablet: 2 tab(s) orally every 6 hours  Cipro 500 mg oral tablet: 1 tab(s) orally 2 times a day start 1 day prior to catheter removal.  Colace 100 mg oral capsule: 1 cap(s) orally 2 times a day  enalapril 20 mg oral tablet: orally once a day  oxyCODONE 5 mg oral tablet: 1 tab(s) orally 4 times a day MDD: 4  rosuvastatin 10 mg oral tablet: 1 tab(s) orally once a day

## 2023-05-11 NOTE — DISCHARGE NOTE PROVIDER - NSDCFUADDINST_GEN_ALL_CORE_FT
drink plenty of fluids  use stool softeners and laxatives as needed  use pain medication as needed Tylenol/ Motrin  incentive spirometer 10x per hour  you may start showering tomorrow  pat dry no rubbing  no baths  Keep Mohamud clean wash with soap and water  keep Mohamud in stat lock  change EFRAIN dressing as needed, when dry may remove dressing  wash area with soap and water pat dry  questions or concerns call the office

## 2023-05-11 NOTE — DISCHARGE NOTE PROVIDER - NSDCCPTREATMENT_GEN_ALL_CORE_FT
PRINCIPAL PROCEDURE  Procedure: Robotic-assisted prostatectomy with pelvic lymph node dissection  Findings and Treatment:

## 2023-05-11 NOTE — DISCHARGE NOTE NURSING/CASE MANAGEMENT/SOCIAL WORK - PATIENT PORTAL LINK FT
You can access the FollowMyHealth Patient Portal offered by [facility] by registering at the following website: [email or web address]. By joining [facility]’s FollowMyHealth portal, you will also be able to view your health information using other applications (apps) compatible with our system.

## 2023-05-11 NOTE — DISCHARGE NOTE NURSING/CASE MANAGEMENT/SOCIAL WORK - WILL THE PATIENT ACCEPT THE PFIZER COVID-19 VACCINE IF ELIGIBLE AND IT IS AVAILABLE?
RAPID RESPONSE TEAM     0538: Instilled 2mg (2cc) cathflo into red port of PICC. Allow to dwell for 120 minutes. Primary RN Rose Marie notified.       Veronika Khoury RN  Ext. [phone] No

## 2023-05-11 NOTE — DISCHARGE NOTE PROVIDER - NSDCFUSCHEDAPPT_GEN_ALL_CORE_FT
[facility]  UROLOGY [address] [facility]  Scheduled Appointment: 05/18/2023    Chandana Harris  [facility]  UROLOGY [address] [facility]  Scheduled Appointment: 06/28/2023

## 2023-05-11 NOTE — PROGRESS NOTE ADULT - ASSESSMENT
Pt is a 63y M POD#0 s/p RALP with PLND.    H/H: 13.7/40.7  Cr: 1.2    ·	18Fr billings in place. DO NOT REMOVE BILLINGS CATHETER. Pt to be discharged home with billings for outpatient removal.   ·	Monitor Billings and EFRAIN outputs   ·	Ancef x3 doses   ·	Pain control   ·	Subq heparin   ·	Incentive spirometer 10x/hr. Pt instructed how to use.   ·	Clear liquid diet. Will plan to advance diet tomorrow   ·	OOB to chair/ ambulate as tolerated   ·	f/u am labs     
62 y/o Male s/p robotic assisted radical prostatectomy and b/l pelvic node dissection.    A) Stable POD # 1    P) D/c EFRAIN  D/c Home with Mohamud to leg bag   OP f./ u next week for TOV  d/w attending

## 2023-05-11 NOTE — PROGRESS NOTE ADULT - SUBJECTIVE AND OBJECTIVE BOX
UROLOGY POST-OP CHECK    Pt is a 63y M POD#0 s/p RALP with PLND. Pt seen and examined at bedside in the RR. Pt resting comfortably. Denies difficulty breathing, SOB, chest pain.      MEDICATIONS  (STANDING):  acetaminophen     Tablet .. 1000 milliGRAM(s) Oral every 6 hours  atorvastatin 40 milliGRAM(s) Oral at bedtime  ceFAZolin   IVPB 2000 milliGRAM(s) IV Intermittent every 8 hours  enalapril 20 milliGRAM(s) Oral daily  heparin   Injectable 5000 Unit(s) SubCutaneous every 8 hours  pantoprazole    Tablet 40 milliGRAM(s) Oral before breakfast  senna 2 Tablet(s) Oral at bedtime  sodium chloride 0.9%. 1000 milliLiter(s) (125 mL/Hr) IV Continuous <Continuous>    MEDICATIONS  (PRN):  HYDROmorphone  Injectable 0.5 milliGRAM(s) IV Push every 10 minutes PRN Moderate Pain (4 - 6)  HYDROmorphone  Injectable 1 milliGRAM(s) IV Push every 10 minutes PRN Severe Pain (7 - 10)  ondansetron Injectable 4 milliGRAM(s) IV Push once PRN Nausea and/or Vomiting  ondansetron Injectable 4 milliGRAM(s) IV Push every 6 hours PRN Nausea and/or Vomiting  oxyCODONE    IR 5 milliGRAM(s) Oral every 6 hours PRN Severe Pain (7 - 10)      REVIEW OF SYSTEMS   [x] A ten-point review of systems was otherwise negative except as noted.    Vital Signs Last 24 Hrs  T(C): 36.8 (10 May 2023 14:05), Max: 36.8 (10 May 2023 06:05)  T(F): 98.2 (10 May 2023 14:05), Max: 98.3 (10 May 2023 06:05)  HR: 96 (10 May 2023 14:05) (83 - 101)  BP: 125/69 (10 May 2023 14:05) (115/69 - 150/87)  BP(mean): 113 (10 May 2023 07:30) (113 - 113)  RR: 16 (10 May 2023 14:05) (16 - 16)  SpO2: 99% (10 May 2023 14:05) (93% - 100%)    Parameters below as of 10 May 2023 14:05  Patient On (Oxygen Delivery Method): room air      PHYSICAL EXAM:  GEN: NAD  NEURO: Awake and alert   SKIN: Good color, non diaphoretic  RESP: Non-labored breathing  CARDIO: +S1/S2  ABDO: Soft, mild juana-incisional ttp, +L sided EFRAIN drain in place   : +18 Fr billings in place, draining yellow urine in tubing   EXT: MARIO x 4    I&O's Summary    10 May 2023 07:01  -  10 May 2023 15:56  --------------------------------------------------------  IN: 250 mL / OUT: 525 mL / NET: -275 mL      LABS:                        13.7   15.41 )-----------( 181      ( 10 May 2023 13:30 )             40.7     05-10    134<L>  |  100  |  24<H>  ----------------------------<  175<H>  4.1   |  21  |  1.2    Ca    8.7      10 May 2023 13:30          
UROLOGY DAILY PROGRESS NOTE  POD # 1    wife at bedside    Pt is a 62 y/o Male s/p robotic assisted radical prostatectomy and b/l pelvic node dissection.  Pt doing well, wants to go home, offers no complaints, denies fever, chills, nausea or vomiting.  Tolerating liquids.    MEDICATIONS  (STANDING):  acetaminophen     Tablet .. 1000 milliGRAM(s) Oral every 6 hours  atorvastatin 40 milliGRAM(s) Oral at bedtime  enalapril 20 milliGRAM(s) Oral daily  heparin   Injectable 5000 Unit(s) SubCutaneous every 8 hours  ketorolac   Injectable 15 milliGRAM(s) IV Push every 6 hours  pantoprazole    Tablet 40 milliGRAM(s) Oral before breakfast  senna 2 Tablet(s) Oral at bedtime  sodium chloride 0.9%. 1000 milliLiter(s) (125 mL/Hr) IV Continuous <Continuous>    MEDICATIONS  (PRN):  ondansetron Injectable 4 milliGRAM(s) IV Push every 6 hours PRN Nausea and/or Vomiting  oxyCODONE    IR 5 milliGRAM(s) Oral every 6 hours PRN Severe Pain (7 - 10)      REVIEW OF SYSTEMS   [x] A ten-point review of systems was otherwise negative except as noted.      Vital Signs Last 24 Hrs  T(C): 37.1 (11 May 2023 04:44), Max: 37.1 (10 May 2023 16:00)  T(F): 98.7 (11 May 2023 04:44), Max: 98.8 (10 May 2023 16:00)  HR: 87 (11 May 2023 04:44) (63 - 101)  BP: 128/73 (11 May 2023 04:44) (112/67 - 140/66)  BP(mean): 100 (10 May 2023 16:00) (100 - 100)  RR: 18 (11 May 2023 04:44) (16 - 18)  SpO2: 96% (11 May 2023 04:44) (93% - 100%)    Parameters below as of 10 May 2023 16:00  Patient On (Oxygen Delivery Method): room air        PHYSICAL EXAM:    GEN: NAD, awake and alert.  SKIN: Good color, non diaphoretic.  RESP: Non-labored breathing. No use of accessory muscles.  ABDO: Soft, NT/ND, no palpable bladder, no suprapubic tenderness, incisions clean and dry  EFRAIN with serosanguinous fluid 60 cc since surgery.  BACK: No CVAT B/L  : + Indwelling Mohamud in place, draining clear yellow urine.   EXT: FABIANO x 4      I&O's Summary    10 May 2023 07:01  -  11 May 2023 07:00  --------------------------------------------------------  IN: 1875 mL / OUT: 2765 mL / NET: -890 mL        LABS:                        12.2   10.15 )-----------( 175      ( 11 May 2023 05:30 )             37.2     05-11    140  |  104  |  18  ----------------------------<  110<H>  4.8   |  27  |  1.1    Ca    8.6      11 May 2023 05:3

## 2023-05-13 DIAGNOSIS — E78.5 HYPERLIPIDEMIA, UNSPECIFIED: ICD-10-CM

## 2023-05-13 DIAGNOSIS — N40.0 BENIGN PROSTATIC HYPERPLASIA WITHOUT LOWER URINARY TRACT SYMPTOMS: ICD-10-CM

## 2023-05-13 DIAGNOSIS — I10 ESSENTIAL (PRIMARY) HYPERTENSION: ICD-10-CM

## 2023-05-13 DIAGNOSIS — C61 MALIGNANT NEOPLASM OF PROSTATE: ICD-10-CM

## 2023-05-16 LAB — SURGICAL PATHOLOGY STUDY: SIGNIFICANT CHANGE UP

## 2023-05-18 ENCOUNTER — APPOINTMENT (OUTPATIENT)
Dept: UROLOGY | Facility: CLINIC | Age: 63
End: 2023-05-18
Payer: COMMERCIAL

## 2023-05-18 VITALS
BODY MASS INDEX: 29.51 KG/M2 | SYSTOLIC BLOOD PRESSURE: 156 MMHG | DIASTOLIC BLOOD PRESSURE: 85 MMHG | HEART RATE: 80 BPM | TEMPERATURE: 98 F | WEIGHT: 188 LBS | HEIGHT: 67 IN

## 2023-05-18 PROCEDURE — 99024 POSTOP FOLLOW-UP VISIT: CPT

## 2023-06-14 ENCOUNTER — NON-APPOINTMENT (OUTPATIENT)
Age: 63
End: 2023-06-14

## 2023-06-27 ENCOUNTER — APPOINTMENT (OUTPATIENT)
Dept: UROLOGY | Facility: CLINIC | Age: 63
End: 2023-06-27
Payer: COMMERCIAL

## 2023-06-27 VITALS
HEIGHT: 67 IN | HEART RATE: 65 BPM | OXYGEN SATURATION: 98 % | RESPIRATION RATE: 18 BRPM | DIASTOLIC BLOOD PRESSURE: 92 MMHG | TEMPERATURE: 97.6 F | SYSTOLIC BLOOD PRESSURE: 164 MMHG | WEIGHT: 188 LBS | BODY MASS INDEX: 29.51 KG/M2

## 2023-06-27 PROCEDURE — 99024 POSTOP FOLLOW-UP VISIT: CPT

## 2023-06-27 RX ORDER — OFLOXACIN OTIC 3 MG/ML
0.3 SOLUTION AURICULAR (OTIC)
Qty: 5 | Refills: 0 | Status: DISCONTINUED | COMMUNITY
Start: 2020-04-21 | End: 2023-06-27

## 2023-06-27 RX ORDER — SILDENAFIL 100 MG/1
100 TABLET, FILM COATED ORAL
Qty: 10 | Refills: 5 | Status: ACTIVE | COMMUNITY
Start: 2023-06-27 | End: 1900-01-01

## 2023-06-27 RX ORDER — SILODOSIN 8 MG/1
8 CAPSULE ORAL DAILY
Qty: 90 | Refills: 3 | Status: DISCONTINUED | COMMUNITY
Start: 2022-05-11 | End: 2023-06-27

## 2023-06-28 ENCOUNTER — APPOINTMENT (OUTPATIENT)
Dept: UROLOGY | Facility: CLINIC | Age: 63
End: 2023-06-28

## 2023-06-29 LAB — PSA, POST - PROSTATECTOMY: <0.01 NG/ML

## 2023-09-21 ENCOUNTER — APPOINTMENT (OUTPATIENT)
Dept: UROLOGY | Facility: CLINIC | Age: 63
End: 2023-09-21
Payer: COMMERCIAL

## 2023-09-21 VITALS
HEIGHT: 67 IN | WEIGHT: 188 LBS | SYSTOLIC BLOOD PRESSURE: 165 MMHG | HEART RATE: 82 BPM | DIASTOLIC BLOOD PRESSURE: 85 MMHG | BODY MASS INDEX: 29.51 KG/M2

## 2023-09-21 PROCEDURE — 99214 OFFICE O/P EST MOD 30 MIN: CPT

## 2024-03-21 ENCOUNTER — APPOINTMENT (OUTPATIENT)
Dept: UROLOGY | Facility: CLINIC | Age: 64
End: 2024-03-21
Payer: COMMERCIAL

## 2024-03-21 DIAGNOSIS — N39.3 STRESS INCONTINENCE (FEMALE) (MALE): ICD-10-CM

## 2024-03-21 PROCEDURE — 99214 OFFICE O/P EST MOD 30 MIN: CPT

## 2024-03-21 PROCEDURE — G2211 COMPLEX E/M VISIT ADD ON: CPT | Mod: NC,1L

## 2024-03-21 NOTE — ADDENDUM
[FreeTextEntry1] : Patient's note was transcribed with the assistance of a medical scribe under the supervision of Dr. Ham. I, Dr. Ham, have reviewed the patient's chart and agree that it aligns with my medical decisions. Dian Garcia, our scribe, also served as a chaperone for physical examination purposes.

## 2024-03-21 NOTE — HISTORY OF PRESENT ILLNESS
[FreeTextEntry1] : NASREEN BETTS is a 64-year-old male hx of elevated PSA status post 2 negative prostate biopsies 2018 and 2020 and negative MRI, BPH and lower urinary tract symptoms not responding to medical management s/p TURP 02/28/2023, found to have incidental [place] 3+4 PCa on prostate chips pathology. s/p robotic radical prostatectomy, bl PLND, bl complete NS 5/10/23.  Pt presents today with his wife. He reports rare TONIA requiring no pads, otherwise flow is good. Pt had no improvement with sildenafil and reports no blood flow. Denies flank pain, gross hematuria, dysuria or associated symptoms.   PSA 09/23/2024 - <0.02  PSA 03/02/2024 - <0.02  previously PSA June 2023 undetectable.  No recent PSA to review. 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 No pelvic lymphadenopathy TURP defect noted. No suspicious lesions.  The pt reports worsening ED but is still sexually active.Following the TURP he does report improved urinary symptoms Pathology Elver 3+4 prostate cancer grade group 2 approximately 6% of entire tissue volume with Elver 3+4 prostate cancer. Single focus of perineural invasion. Predominantly Elver 3+3 prostate cancer and less than 10% is grade 4.  PSA 3.5 07/2022 PSA 5.1 February 2021 PSA 3.31 4K score 2% risk May 2019  Denies  PMH including previous kidney stones, recurrent UTIs. 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 Elver 4 makes up less than 10% of tissue.  Hx of two negative prostate biopsies for negative PSA

## 2024-03-21 NOTE — ASSESSMENT
[FreeTextEntry1] : NASREEN BETTS is a 64-year-old male hx of elevated PSA status post 2 negative prostate biopsies 2018 and 2020 and negative MRI, BPH and lower urinary tract symptoms not responding to medical management s/p TURP 02/28/2023, found to have incidental Katie 3+4 PCa on prostate chips pathology. s/p robotic radical prostatectomy, bl PLND, bl complete NS 5/10/23.  Dry, dominique Patient has persistent erectile dysfunction.  He can stop sildenafil Rx as he has no improvement we discussed other options such as injections and IPP which I encouraged him to pursue.  Refer him to dr elliot Patel - f/u 6 months, PSA prior for PCa  - referral to Dr. Stevens for chronic ED  cont [facility]

## 2024-05-24 ENCOUNTER — APPOINTMENT (OUTPATIENT)
Dept: UROLOGY | Facility: CLINIC | Age: 64
End: 2024-05-24
Payer: COMMERCIAL

## 2024-05-24 VITALS
OXYGEN SATURATION: 99 % | BODY MASS INDEX: 28.72 KG/M2 | DIASTOLIC BLOOD PRESSURE: 90 MMHG | TEMPERATURE: 98 F | SYSTOLIC BLOOD PRESSURE: 177 MMHG | WEIGHT: 183 LBS | HEART RATE: 78 BPM | HEIGHT: 67 IN

## 2024-05-24 DIAGNOSIS — N40.1 BENIGN PROSTATIC HYPERPLASIA WITH LOWER URINARY TRACT SYMPMS: ICD-10-CM

## 2024-05-24 DIAGNOSIS — N13.8 BENIGN PROSTATIC HYPERPLASIA WITH LOWER URINARY TRACT SYMPMS: ICD-10-CM

## 2024-05-24 PROCEDURE — 99214 OFFICE O/P EST MOD 30 MIN: CPT

## 2024-05-24 PROCEDURE — G2211 COMPLEX E/M VISIT ADD ON: CPT | Mod: NC

## 2024-05-24 RX ORDER — TADALAFIL 20 MG/1
20 TABLET, FILM COATED ORAL
Qty: 12 | Refills: 0 | Status: ACTIVE | COMMUNITY
Start: 2024-05-24 | End: 1900-01-01

## 2024-05-24 RX ORDER — PAPAVERINE HYDROCHLORIDE 30 MG/ML
30 INJECTION, SOLUTION INTRAVENOUS
Qty: 0.5 | Refills: 0 | Status: ACTIVE | COMMUNITY
Start: 2024-05-24 | End: 1900-01-01

## 2024-05-24 NOTE — HISTORY OF PRESENT ILLNESS
[FreeTextEntry1] : Cam is a 64-year-old male, with a history of elevated PSA, status post 2 negative prostate biopsies in 2018 and 2020 and a negative MRI, status post TURP in February 2023 for BPH where Makinen 7 (3+4) prostate cancer was found on prostate chips.  Status post robotic radical prostatectomy with bilateral PLND on 5/10/2023.  Post procedure PSA undetectable however, developed worsening erectile dysfunction.  He has tried sildenafil 100 mg without any significant improvement.

## 2024-05-24 NOTE — PLAN

## 2024-05-24 NOTE — ASSESSMENT
[FreeTextEntry1] : Cam is a 64-year-old male, with a history of elevated PSA, status post 2 negative prostate biopsies in 2018 and 2020 and a negative MRI, status post TURP in February 2023 for BPH where [place] 7 (3+4) prostate cancer was found on prostate chips.  Status post robotic radical prostatectomy with bilateral PLND on 5/10/2023.Now with refractory ED to PDE 5 inhibitors. #Erectile dysfunction After thorough review of all the options available patient is electing to try Trimix.  A test dose has been ordered and he will follow-up for injection training. - Plan for Trial of Intracavernosal penile injections with Trimix (Papaverine 30mg/mL, phentolamine 1mg/mL, PGE1 10 mcg/mL), prescription sent to compounding pharmacy for test dose to be administered by MD/PA  Erectile dysfunction, its etiology, risk factors and natural history were discussed with the patient. Work-up and empiric management were discussed. From least to most invasive, treatments including behavioral changes (weight loss, exercise, improved sleep), oral PDE5i, vacuum erection device, intraurethral pellets, intracavernosal injections, and penile prosthetic implantation were described. Relevant individual risks and benefits disclosed. I explained that there are different causes for ED including psychogenic, vasculogenic, neurogenic and medication side-effect related causes. Oftentimes there are multiple causes.   The patient understands that the risks of PDE5is include facial redness, flushing, GERD, back pain, priapism, chest pain/MI, arrhythmia, dizziness, drop in BP, impaired vision and loss of hearing. He understands that the medication may take up to an hour to function and requires sexual stimulation. He was told not to take his medication within 4 hours of alpha blockers, or not at all if ever taking nitroglycerin. Both sildenafil and vardenafil, but not tadalafil, have some cross-reactivity with PDE6 and thus may produce visual side effects. He also was told to go to the ER immediately if he noticed any blindness or visual changes, or for any painful erection lasting > 4 hrs. He denies any history nitrate medication use for angina.

## 2024-05-24 NOTE — HISTORY OF PRESENT ILLNESS
[FreeTextEntry1] : Cam is a 64-year-old male, with a history of elevated PSA, status post 2 negative prostate biopsies in 2018 and 2020 and a negative MRI, status post TURP in February 2023 for BPH where [place] 7 (3+4) prostate cancer was found on prostate chips.  Status post robotic radical prostatectomy with bilateral PLND on 5/10/2023.  Post procedure PSA undetectable however, developed worsening erectile dysfunction.  He has tried sildenafil 100 mg without any significant improvement.

## 2024-05-24 NOTE — ASSESSMENT
[FreeTextEntry1] : Cam is a 64-year-old male, with a history of elevated PSA, status post 2 negative prostate biopsies in 2018 and 2020 and a negative MRI, status post TURP in February 2023 for BPH where [place] 7 (3+4) prostate cancer was found on prostate chips.  Status post robotic radical prostatectomy with bilateral PLND on 5/10/2023.Now with refractory ED to PDE 5 inhibitors. #Erectile dysfunction After thorough review of all the options available patient is electing to try Trimix.  A test dose has been ordered and he will follow-up for injection training. - Plan for Trial of Intracavernosal penile injections with Trimix (Papaverine 30mg/mL, phentolamine 1mg/mL, PGE1 10 mcg/mL), prescription sent to compounding pharmacy for test dose to be administered by MD/PA  Erectile dysfunction, its etiology, risk factors and natural history were discussed with the patient. Work-up and empiric management were discussed. From least to most invasive, treatments including behavioral changes (weight loss, exercise, improved sleep), oral PDE5i, vacuum erection device, intraurethral pellets, intracavernosal injections, and penile prosthetic implantation were described. Relevant individual risks and benefits disclosed. I explained that there are different causes for ED including psychogenic, vasculogenic, neurogenic and medication side-effect related causes. Oftentimes there are multiple causes.   The patient understands that the risks of PDE5is include facial redness, flushing, GERD, back pain, priapism, chest pain/MI, arrhythmia, dizziness, drop in BP, impaired vision and loss of hearing. He understands that the medication may take up to an hour to function and requires sexual stimulation. He was told not to take his medication within 4 hours of alpha blockers, or not at all if ever taking nitroglycerin. Both sildenafil and vardenafil, but not tadalafil, have some cross-reactivity with PDE6 and thus may produce visual side effects. He also was told to go to the ER immediately if he noticed any blindness or visual changes, or for any painful erection lasting > 4 hrs. He denies any history nitrate medication use for angina.

## 2024-05-24 NOTE — PLAN

## 2024-05-25 NOTE — H&P PST ADULT - HEIGHT IN FEET
Pt heard running down the hallway. When approached, pt had 2 cups of water In hand with a toothbrush. Instructed pt to stop running in halls and tried to redirect back to bedroom. Pt began pouring water from cup onto floor. Pt extremely disorganized and appeared anxious/shaky. Pt denied any s/s of distress and BS taken was 149. Pt was given PRN ativan 1 mg PO for anxiety. Ham=28.   5

## 2024-06-21 ENCOUNTER — APPOINTMENT (OUTPATIENT)
Dept: UROLOGY | Facility: CLINIC | Age: 64
End: 2024-06-21
Payer: COMMERCIAL

## 2024-06-21 DIAGNOSIS — C61 MALIGNANT NEOPLASM OF PROSTATE: ICD-10-CM

## 2024-06-21 DIAGNOSIS — N52.9 MALE ERECTILE DYSFUNCTION, UNSPECIFIED: ICD-10-CM

## 2024-06-21 PROCEDURE — J3490T: CUSTOM | Mod: NC

## 2024-06-21 PROCEDURE — 54235 NJX CORPORA CAVERNOSA RX AGT: CPT

## 2024-06-21 PROCEDURE — 99214 OFFICE O/P EST MOD 30 MIN: CPT | Mod: 25

## 2024-06-21 RX ORDER — PAPAVERINE HYDROCHLORIDE 30 MG/ML
30 INJECTION, SOLUTION INTRAVENOUS
Qty: 10 | Refills: 3 | Status: ACTIVE | COMMUNITY
Start: 2024-06-21 | End: 1900-01-01

## 2024-06-21 NOTE — ASSESSMENT
[FreeTextEntry1] : Cam is a 64-year-old male, with a history of elevated PSA, status post 2 negative prostate biopsies in 2018 and 2020 and a negative MRI, status post TURP in February 2023 for BPH where [employer] [employer] 7 (3+4) prostate cancer was found on prostate chips. Status post robotic radical prostatectomy with bilateral PLND on 5/10/2023.Now with refractory ED to PDE 5 inhibitors.  #Erectile dysfunction - s/p Trimix injection, refer to separate procedure note, decent response, 95% tumescence but 75% rigidity with 30/1/10, 0.2cc injection.  -Will start Trimix (Papaverine 30mg/mL, phentolamine 2mg/mL, PGE1 20 mcg/mL), prescription sent to compounding pharmacy.  He will use 0.2 mL titrating up 0.1.  Erectile dysfunction, its etiology, risk factors and natural history were discussed with the patient. Work-up and empiric management were discussed. From least to most invasive, treatments including behavioral changes (weight loss, exercise, improved sleep), oral PDE5i, vacuum erection device, intraurethral pellets, intracavernosal injections, and penile prosthetic implantation were described. Relevant individual risks and benefits disclosed. I explained that there are different causes for ED including psychogenic, vasculogenic, neurogenic and medication side-effect related causes. Oftentimes there are multiple causes.  The patient understands that the risks of PDE5is include facial redness, flushing, GERD, back pain, priapism, chest pain/MI, arrhythmia, dizziness, drop in BP, impaired vision and loss of hearing. He understands that the medication may take up to an hour to function and requires sexual stimulation. He was told not to take his medication within 4 hours of alpha blockers, or not at all if ever taking nitroglycerin. Both sildenafil and vardenafil, but not tadalafil, have some cross-reactivity with PDE6 and thus may produce visual side effects. He also was told to go to the ER immediately if he noticed any blindness or visual changes, or for any painful erection lasting > 4 hrs. He denies any history nitrate medication use for angina.  #Prostate Cancer - Following PSAs with Dr. Ham

## 2024-06-21 NOTE — PLAN

## 2024-06-21 NOTE — HISTORY OF PRESENT ILLNESS
[FreeTextEntry1] : Cam is a 64-year-old male, with a history of elevated PSA, status post 2 negative prostate biopsies in 2018 and 2020 and a negative MRI, status post TURP in February 2023 for BPH where [place] 7 (3+4) prostate cancer was found on prostate chips. Status post robotic radical prostatectomy with bilateral PLND on 5/10/2023. Post procedure PSA undetectable however, developed worsening erectile dysfunction. He has tried sildenafil 100 mg without any significant improvement.  Office visit 6/21/2024 Patient presents today to review symptoms of erectile dysfunction as well as for intracavernosal injection with Trimix which will be dictated separately in the procedure note.  Patient reports that he still has not been having satisfactory erections while on max PDE 5 inhibitor oral medications.  The patient denies having any fevers, chills, nausea, vomiting, flank/abdominal pain or any irritative voiding symptoms.

## 2024-09-19 ENCOUNTER — APPOINTMENT (OUTPATIENT)
Dept: UROLOGY | Facility: CLINIC | Age: 64
End: 2024-09-19
Payer: COMMERCIAL

## 2024-09-19 ENCOUNTER — NON-APPOINTMENT (OUTPATIENT)
Age: 64
End: 2024-09-19

## 2024-09-19 DIAGNOSIS — N39.3 STRESS INCONTINENCE (FEMALE) (MALE): ICD-10-CM

## 2024-09-19 PROCEDURE — 81003 URINALYSIS AUTO W/O SCOPE: CPT | Mod: QW

## 2024-09-19 PROCEDURE — 99214 OFFICE O/P EST MOD 30 MIN: CPT

## 2024-09-19 NOTE — ASSESSMENT
[FreeTextEntry1] : NASREEN BETTS is a 64-year-old male hx of elevated PSA status post 2 negative prostate biopsies 2018 and 2020 and negative MRI, BPH and lower urinary tract symptoms not responding to medical management s/p TURP 02/28/2023, found to have incidental [place] 3+4 PCa on prostate chips pathology. s/p robotic radical prostatectomy, bl PLND, bl complete NS 5/10/23.  With erectile dysfunction. We had a discussion today regarding options for patient erectile dysfunction and I recommended that he consider IPP. He will discuss further with his wife and Dr. Espinal - f/u 6 months, PSA prior for PCa

## 2024-09-19 NOTE — ADDENDUM
[FreeTextEntry1] : Patient's note was transcribed with the assistance of a medical scribe under the supervision of Dr. Ham. I, Dr. Ham, have reviewed the patient's chart and agree that it aligns with my medical decisions. Dian Garcia, our scribe, also served as a chaperone for physical examination purposes.  The submitted E/M billing level for this visit reflects the total time spent on the day of the visit including face-to-face time spent with the patient, non-face-to-face review of medical records and relevant information, documentation, and asynchronous communication with the patient after a visit via phone, email, or patients EHR portal after the visit.  The medical records reviewed are either scanned into the chart or reviewed with the patient using a patients electronic medical records portal for patients with records not available to [facility] via electronic transmission platforms from other institutions and labs.  Time spend counseling and performing coordination of care was also included in determining the appropriate EM billing level.

## 2024-09-19 NOTE — HISTORY OF PRESENT ILLNESS
[FreeTextEntry1] : NASREEN BETTS is a 64-year-old male hx of elevated PSA status post 2 negative prostate biopsies 2018 and 2020 and negative MRI, BPH and lower urinary tract symptoms not responding to medical management s/p TURP 02/28/2023, found to have incidental [place] 3+4 PCa on prostate chips pathology. s/p robotic radical prostatectomy, bl PLND, bl complete NS 5/10/23.  Pt reports rare incontinence, about once a month. and does not require any pads. States He had no improvement with erections on injections, not enough to penetrate. Denies flank pain, gross hematuria, dysuria or associated symptoms.   PSA Post 09/07/2024 - <0.02  previously, PSA 09/23/2024 - <0.02  PSA 03/02/2024 - <0.02  PSA June 2023 undetectable.  No recent PSA to review. 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 No pelvic lymphadenopathy TURP defect noted. No suspicious lesions.  The pt reports worsening ED but is still sexually active.Following the TURP he does report improved urinary symptoms Pathology [place] 3+4 prostate cancer grade group 2 approximately 6% of entire tissue volume with [place] 3+4 prostate cancer. Single focus of perineural invasion. Predominantly [place] 3+3 prostate cancer and less than 10% is grade 4.  PSA 3.5 07/2022 PSA 5.1 February 2021 PSA 3.31 4K score 2% risk May 2019  Denies  PMH including previous kidney stones, recurrent UTIs. Family History: No  malignancies Social History: [place], son son now 1 year medical student,  PSH no abdominal  Old notes :  Prostate chips evaluated and found to have [place] 3+4 (7) and 6% of the prostate chips. [place] 4 makes up less than 10% of tissue.  Hx of two negative prostate biopsies for negative PSA

## 2024-09-20 ENCOUNTER — APPOINTMENT (OUTPATIENT)
Dept: UROLOGY | Facility: CLINIC | Age: 64
End: 2024-09-20
Payer: COMMERCIAL

## 2024-09-20 VITALS
SYSTOLIC BLOOD PRESSURE: 171 MMHG | DIASTOLIC BLOOD PRESSURE: 123 MMHG | WEIGHT: 183 LBS | TEMPERATURE: 98 F | BODY MASS INDEX: 28.72 KG/M2 | HEIGHT: 67 IN | RESPIRATION RATE: 17 BRPM | OXYGEN SATURATION: 96 % | HEART RATE: 85 BPM

## 2024-09-20 DIAGNOSIS — N52.9 MALE ERECTILE DYSFUNCTION, UNSPECIFIED: ICD-10-CM

## 2024-09-20 PROCEDURE — G2211 COMPLEX E/M VISIT ADD ON: CPT | Mod: NC

## 2024-09-20 PROCEDURE — 99213 OFFICE O/P EST LOW 20 MIN: CPT

## 2024-09-20 NOTE — ASSESSMENT
[FreeTextEntry1] : Cam is a 64-year-old male, with a history of elevated PSA, status post 2 negative prostate biopsies in 2018 and 2020 and a negative MRI, status post TURP in February 2023 for BPH where [place] 7 (3+4) prostate cancer was found on prostate chips. Status post robotic radical prostatectomy with bilateral PLND on 5/10/2023.Now with refractory ED to PDE 5 inhibitors.  #Erectile dysfunction - s/p Trimix injection, refer to separate procedure note, decent response, 95% tumescence but 75% rigidity with 30/1/10, 0.2cc injection. - C/w Trimix 30-2-20, patient understands that he should inject 20 units and call and go up by 5 units up to a maximum of 50 units.  Also spent time educating patient on his injection technique. - He is adamant about not having surgery for penile implant at this time.  I explained to patient that if he is still having unsatisfactory results on his current concentration, we can go up to Trimix 30 - 4 - 40 next visit  He will see me in 6 months  Erectile dysfunction, its etiology, risk factors and natural history were discussed with the patient. Work-up and empiric management were discussed. From least to most invasive, treatments including behavioral changes (weight loss, exercise, improved sleep), oral PDE5i, vacuum erection device, intraurethral pellets, intracavernosal injections, and penile prosthetic implantation were described. Relevant individual risks and benefits disclosed. I explained that there are different causes for ED including psychogenic, vasculogenic, neurogenic and medication side-effect related causes. Oftentimes there are multiple causes.  The patient understands that the risks of PDE5is include facial redness, flushing, GERD, back pain, priapism, chest pain/MI, arrhythmia, dizziness, drop in BP, impaired vision and loss of hearing. He understands that the medication may take up to an hour to function and requires sexual stimulation. He was told not to take his medication within 4 hours of alpha blockers, or not at all if ever taking nitroglycerin. Both sildenafil and vardenafil, but not tadalafil, have some cross-reactivity with PDE6 and thus may produce visual side effects. He also was told to go to the ER immediately if he noticed any blindness or visual changes, or for any painful erection lasting > 4 hrs. He denies any history nitrate medication use for angina.  #Prostate Cancer - Following PSAs with Dr. Ham.

## 2024-09-20 NOTE — HISTORY OF PRESENT ILLNESS
[FreeTextEntry1] : Cam is a 64-year-old male, with a history of elevated PSA, status post 2 negative prostate biopsies in 2018 and 2020 and a negative MRI, status post TURP in February 2023 for BPH where [place] 7 (3+4) prostate cancer was found on prostate chips. Status post robotic radical prostatectomy with bilateral PLND on 5/10/2023. Post procedure PSA undetectable however, developed worsening erectile dysfunction. He has tried sildenafil 100 mg without any significant improvement.  Office visit 6/21/2024 Patient presents today to review symptoms of erectile dysfunction as well as for intracavernosal injection with Trimix which will be dictated separately in the procedure note. Patient reports that he still has not been having satisfactory erections while on max PDE 5 inhibitor oral medications. The patient denies having any fevers, chills, nausea, vomiting, flank/abdominal pain or any irritative voiding symptoms.  Office Visit 09/20/2024  Pt reports that he has been injecting 20 units of Trimix 30 - 2 - 20 with unsatisfactory results.  When asked patient to describe his injection technique it seemed that patient was injecting subcutaneously at an angle.  I once again reeducated the patient that he should not be injecting with his syringe at a 90 degree angle completely submerging the needle with intracavernosal body.  Patient also understands that he can go up by 5 units to a maximum of 50 units with his medication.  Patient reports that he was hesitant to go up on his dose without speaking with me first.  He is still adamant about not having surgery for penile implant.

## 2024-09-20 NOTE — HISTORY OF PRESENT ILLNESS
[FreeTextEntry1] : Cam is a 64-year-old male, with a history of elevated PSA, status post 2 negative prostate biopsies in 2018 and 2020 and a negative MRI, status post TURP in February 2023 for BPH where [place] 7 (3+4) prostate cancer was found on prostate chips. Status post robotic radical prostatectomy with bilateral PLND on 5/10/2023. Post procedure PSA undetectable however, developed worsening erectile dysfunction. He has tried sildenafil 100 mg without any significant improvement.  Office visit 6/21/2024 Patient presents today to review symptoms of erectile dysfunction as well as for intracavernosal injection with Trimix which will be dictated separately in the procedure note. Patient reports that he still has not been having satisfactory erections while on max PDE 5 inhibitor oral medications. The patient denies having any fevers, chills, nausea, vomiting, flank/abdominal pain or any irritative voiding symptoms.  Office Visit 09/20/2024  Pt reports that he has been injecting 20 units of Trimix 30 - 2 - 20 with unsatisfactory results.  When asked patient to describe his injection technique it seemed that patient was injecting subcutaneously at an angle.  I once again reeducated the patient that he should not be injecting with his syringe at a 90 degree angle completely submerging the needle with intracavernosal body.  Patient also understands that he can go up by 5 units to a maximum of 50 units with his medication.  Patient reports that he was hesitant to go up on his dose without speaking with me first.  He is still adamant about not having surgery for penile implant.

## 2024-09-20 NOTE — PLAN

## 2024-09-20 NOTE — ASSESSMENT
[FreeTextEntry1] : Cam is a 64-year-old male, with a history of elevated PSA, status post 2 negative prostate biopsies in 2018 and 2020 and a negative MRI, status post TURP in February 2023 for BPH where [place] 7 (3+4) prostate cancer was found on prostate chips. Status post robotic radical prostatectomy with bilateral PLND on 5/10/2023.Now with refractory ED to PDE 5 inhibitors.  #Erectile dysfunction - s/p Trimix injection, refer to separate procedure note, decent response, 95% tumescence but 75% rigidity with 30/1/10, 0.2cc injection. - C/w Trimix 30-2-20, patient understands that he should inject 20 units and call and go up by 5 units up to a maximum of 50 units.  Also spent time educating patient on his injection technique. - He is adamant about not having surgery for penile implant at this time.  I explained to patient that if he is still having unsatisfactory results on his current concentration, we can go up to Trimix 30 - 4 - 40 next visit  He will see me in 6 months  Erectile dysfunction, its etiology, risk factors and natural history were discussed with the patient. Work-up and empiric management were discussed. From least to most invasive, treatments including behavioral changes (weight loss, exercise, improved sleep), oral PDE5i, vacuum erection device, intraurethral pellets, intracavernosal injections, and penile prosthetic implantation were described. Relevant individual risks and benefits disclosed. I explained that there are different causes for ED including psychogenic, vasculogenic, neurogenic and medication side-effect related causes. Oftentimes there are multiple causes.  The patient understands that the risks of PDE5is include facial redness, flushing, GERD, back pain, priapism, chest pain/MI, arrhythmia, dizziness, drop in BP, impaired vision and loss of hearing. He understands that the medication may take up to an hour to function and requires sexual stimulation. He was told not to take his medication within 4 hours of alpha blockers, or not at all if ever taking nitroglycerin. Both sildenafil and vardenafil, but not tadalafil, have some cross-reactivity with PDE6 and thus may produce visual side effects. He also was told to go to the ER immediately if he noticed any blindness or visual changes, or for any painful erection lasting > 4 hrs. He denies any history nitrate medication use for angina.  #Prostate Cancer - Following PSAs with Dr. Ham.

## 2024-09-27 LAB
BILIRUB UR QL STRIP: NORMAL
COLLECTION METHOD: NORMAL
GLUCOSE UR-MCNC: NORMAL
HCG UR QL: 0.2 EU/DL
HGB UR QL STRIP.AUTO: NORMAL
KETONES UR-MCNC: NORMAL
LEUKOCYTE ESTERASE UR QL STRIP: NORMAL
NITRITE UR QL STRIP: NORMAL
PH UR STRIP: 6
PROT UR STRIP-MCNC: NORMAL
SP GR UR STRIP: 1.01

## 2024-12-19 NOTE — ASU PATIENT PROFILE, ADULT - SURGICAL SITE INCISION
[No Acute Distress] : no acute distress [Well Nourished] : well nourished [Normal Sclera/Conjunctiva] : normal sclera/conjunctiva [Normal Outer Ear/Nose] : the outer ears and nose were normal in appearance [Normal Oropharynx] : the oropharynx was normal [No JVD] : no jugular venous distention [No Respiratory Distress] : no respiratory distress  [No Accessory Muscle Use] : no accessory muscle use [Clear to Auscultation] : lungs were clear to auscultation bilaterally [Regular Rhythm] : with a regular rhythm [Bradycardia] : bradycardic [Soft] : abdomen soft [No HSM] : no HSM no

## 2025-01-14 NOTE — ASU PATIENT PROFILE, ADULT - TOBACCO USE
Wound Procedure Treatment Diabetic Ulcer Right;Plantar Toe D1, great    Performed by: Courtney Rodriguez RN  Authorized by: Zac Peters DPM    Associated wounds:   Wound 01/14/25 Diabetic Ulcer Toe D1, great Right;Plantar  Wound cleansed with:  Wound aggrssively cleansed with NSS and gauze  Applied primary dressing:  Acticoat  Applied secondary dressing:  Gauze  Dressing secured with:  Tape  Comments:  Acticoat 3       Former smoker

## 2025-03-20 ENCOUNTER — APPOINTMENT (OUTPATIENT)
Dept: UROLOGY | Facility: CLINIC | Age: 65
End: 2025-03-20
Payer: COMMERCIAL

## 2025-03-20 DIAGNOSIS — C61 MALIGNANT NEOPLASM OF PROSTATE: ICD-10-CM

## 2025-03-20 DIAGNOSIS — N52.9 MALE ERECTILE DYSFUNCTION, UNSPECIFIED: ICD-10-CM

## 2025-03-20 PROCEDURE — 99214 OFFICE O/P EST MOD 30 MIN: CPT

## 2025-03-20 PROCEDURE — G2211 COMPLEX E/M VISIT ADD ON: CPT | Mod: NC

## 2025-03-21 ENCOUNTER — APPOINTMENT (OUTPATIENT)
Dept: UROLOGY | Facility: CLINIC | Age: 65
End: 2025-03-21

## 2025-07-31 ENCOUNTER — OUTPATIENT (OUTPATIENT)
Dept: OUTPATIENT SERVICES | Facility: HOSPITAL | Age: 65
LOS: 1 days | End: 2025-07-31
Payer: COMMERCIAL

## 2025-07-31 DIAGNOSIS — Z98.890 OTHER SPECIFIED POSTPROCEDURAL STATES: Chronic | ICD-10-CM

## 2025-07-31 DIAGNOSIS — R07.9 CHEST PAIN, UNSPECIFIED: ICD-10-CM

## 2025-07-31 DIAGNOSIS — Z00.8 ENCOUNTER FOR OTHER GENERAL EXAMINATION: ICD-10-CM

## 2025-07-31 PROCEDURE — 75574 CT ANGIO HRT W/3D IMAGE: CPT | Mod: 26

## 2025-07-31 PROCEDURE — 75574 CT ANGIO HRT W/3D IMAGE: CPT

## 2025-08-01 DIAGNOSIS — R07.9 CHEST PAIN, UNSPECIFIED: ICD-10-CM

## 2025-08-08 ENCOUNTER — OUTPATIENT (OUTPATIENT)
Dept: OUTPATIENT SERVICES | Facility: HOSPITAL | Age: 65
LOS: 1 days | End: 2025-08-08
Payer: COMMERCIAL

## 2025-08-08 DIAGNOSIS — Z98.890 OTHER SPECIFIED POSTPROCEDURAL STATES: Chronic | ICD-10-CM

## 2025-08-08 DIAGNOSIS — R07.9 CHEST PAIN, UNSPECIFIED: ICD-10-CM

## 2025-08-08 PROCEDURE — 75580 N-INVAS EST C FFR SW ALY CTA: CPT

## 2025-08-08 PROCEDURE — 75580 N-INVAS EST C FFR SW ALY CTA: CPT | Mod: 26

## 2025-08-09 DIAGNOSIS — R07.9 CHEST PAIN, UNSPECIFIED: ICD-10-CM
